# Patient Record
Sex: MALE | Race: BLACK OR AFRICAN AMERICAN | Employment: PART TIME | URBAN - METROPOLITAN AREA
[De-identification: names, ages, dates, MRNs, and addresses within clinical notes are randomized per-mention and may not be internally consistent; named-entity substitution may affect disease eponyms.]

---

## 2018-09-05 ENCOUNTER — PRE VISIT (OUTPATIENT)
Dept: ORTHOPEDICS | Facility: CLINIC | Age: 33
End: 2018-09-05

## 2018-09-05 DIAGNOSIS — M25.561 CHRONIC PAIN OF RIGHT KNEE: Primary | ICD-10-CM

## 2018-09-05 DIAGNOSIS — G89.29 CHRONIC PAIN OF RIGHT KNEE: Primary | ICD-10-CM

## 2018-09-05 NOTE — TELEPHONE ENCOUNTER
FUTURE VISIT INFORMATION      FUTURE VISIT INFORMATION:    Date: 9/6    Time: 12:45    [place] [facility] [place]  REFERRAL INFORMATION:    Referring provider:      Referring providers clinic:      Reason for visit/diagnosis  r knee pain    RECORDS REQUESTED FROM:       Clinic name Comments Records Status Imaging Status   [facility]     [facility]  received In pacs                             RECORDS STATUS

## 2018-09-06 ENCOUNTER — RADIANT APPOINTMENT (OUTPATIENT)
Dept: GENERAL RADIOLOGY | Facility: CLINIC | Age: 33
End: 2018-09-06
Attending: NURSE PRACTITIONER
Payer: COMMERCIAL

## 2018-09-06 ENCOUNTER — OFFICE VISIT (OUTPATIENT)
Dept: ORTHOPEDICS | Facility: CLINIC | Age: 33
End: 2018-09-06
Payer: COMMERCIAL

## 2018-09-06 VITALS — HEIGHT: 70 IN | WEIGHT: 173 LBS | BODY MASS INDEX: 24.77 KG/M2

## 2018-09-06 DIAGNOSIS — G89.29 CHRONIC PAIN OF RIGHT KNEE: ICD-10-CM

## 2018-09-06 DIAGNOSIS — M25.561 CHRONIC PAIN OF RIGHT KNEE: ICD-10-CM

## 2018-09-06 DIAGNOSIS — Z98.890 STATUS POST OSTEOTOMY: Primary | ICD-10-CM

## 2018-09-06 NOTE — MR AVS SNAPSHOT
After Visit Summary   9/6/2018    Kirit Logan    MRN: [id number]           Patient Information     Date Of Birth          1985        Visit Information        Provider Department      9/6/2018 12:45 PM Sheree Tapia APRN [facility]        Today's Diagnoses     Status post osteotomy    -  1       Follow-ups after your visit        Your next 10 appointments already scheduled     Sep 29, 2018 11:00 AM CDT   CT KNEE RIGHT W CONTRAST with UCCT1   [facility] CT ([facility])    [address] [address] [phone]           Please bring any scans or X-rays taken at other hospitals, if similar tests were done. Also bring a list of your medicines, including vitamins, minerals and over-the-counter drugs. It is safest to leave personal items at home.  Be sure to tell your doctor:   If you have any allergies.   If there s any chance you are pregnant.   If you are breastfeeding.    If you have diabetes as your medication may need to be adjusted for this exam.  You will have contrast for this exam. To prepare:   Do not eat or drink for 2 hours before your exam. If you need to take medicine, you may take it with small sips of water. (We may ask you to take liquid medicine as well.)   The day before your exam, drink extra fluids at least six 8-ounce glasses (unless your doctor tells you to restrict your fluids).  Patients over 70 or patients with diabetes or kidney problems:   If you haven t had a blood test (creatinine test) within the last 30 days, the Cardiologist/Radiologist may require you to get this test prior to your exam.  Please wear loose clothing, such as a sweat suit or jogging clothes. Avoid snaps, zippers and other metal. We may ask you to undress and put on a hospital gown.  If you have any questions, please call the Imaging Department where you will have your exam.            Oct 01, 2018 12:00 PM CDT  "  (Arrive by 11:45 AM)   NEW KNEE with Shaheed Stallworth MD   [facility] ([facility] [place])    [address]  [address] [facility] [address] [phone]              Future tests that were ordered for you today     Open Future Orders        Priority Expected Expires Ordered    CT Knee Right w Contrast Routine  2019            Who to contact     Please call your clinic at [phone] to:    Ask questions about your health    Make or cancel appointments    Discuss your medicines    Learn about your test results    Speak to your doctor            Additional Information About Your Visit        [employer] Information     The Local is an electronic gateway that provides easy, online access to your medical records. With The Local, you can request a clinic appointment, read your test results, renew a prescription or communicate with your care team.     To sign up for The Local visit the website at [email or web address]   You will be asked to enter the access code listed below, as well as some personal information. Please follow the directions to create your username and password.     Your access code is: [email or web address]  Expires: 2018  6:31 AM     Your access code will  in 90 days. If you need help or a new code, please contact your [facility] or call [phone] for assistance.        Care EveryWhere ID     This is your Care EveryWhere ID. This could be used by other organizations to access your [facility] medical records  [id number]        Your Vitals Were     Height BMI (Body Mass Index)                1.765 m (5' 9.5\") 25.18 kg/m2           Blood Pressure from Last 3 Encounters:   13 140/88   12 132/85    Weight from Last 3 Encounters:   18 78.5 kg (173 lb)   13 72.6 kg (160 lb)   12 72.6 kg (160 lb)                 Today's Medication Changes          These changes are accurate as of 18  " 3:55 PM.  If you have any questions, ask your nurse or doctor.               Stop taking these medicines if you haven't already. Please contact your care team if you have questions.     HYDROcodone-acetaminophen 5-325 MG per tablet   Commonly known as:  NORCO   Stopped by:  Sheree Tapia APRN CNP           ibuprofen 600 MG tablet   Commonly known as:  ADVIL/MOTRIN   Stopped by:  Sheree Tapia APRN CNP                    Primary Care Provider Office Phone # Fax #    [facility] [phone] [address] [place] [address]        Equal Access to Services     [facility]: Hadii carlos fermin hadasho Soomaali, waaxda luqadaha, qaybta kaalmada adepatito, jose alicea . So [facility] [phone].    ATENCIÓN: Si habla español, tiene a santos disposición servicios gratuitos de asistencia lingüística. [facility] [phone].    We comply with applicable federal civil rights laws and [place] laws. We do not discriminate on the basis of race, color, national origin, age, disability, sex, sexual orientation, or gender identity.            Thank you!     Thank you for choosing [facility]  for your care. Our goal is always to provide you with excellent care. Hearing back from our patients is one way we can continue to improve our services. Please take a few minutes to complete the written survey that you may receive in the mail after your visit with us. Thank you!             Your Updated Medication List - Protect others around you: Learn how to safely use, store and throw away your medicines at www.disposemymeds.org.      Notice  As of 9/6/2018  3:55 PM    You have not been prescribed any medications.

## 2018-09-06 NOTE — LETTER
"9/6/2018       RE: Kirit Logan  [address] [place] [address]     Dear Colleague,    Thank you for referring your patient, Kirit Logan, [facility]. Please see a copy of my visit note below.    CC: right knee pain    HPI:  Kirit is seen as a new patient with a chief complaint \"of worsening knee pain and it's too crooked\". He reports a history of \"pain to the outside of his knee\" that worsened after a few episodes of \"tweaking it\". Was seen and evaluated by Dr. Bucio where an HTO with IF was performed 10/2017. Patient states \"the pain has only gotten worse to the outside of his knee.\" He states the pain feels \"deep\". Denies mechanical symptoms and denies ever having medial joint pain or a varus deformity. He is confused about his post op recovery \"and why he even had this done\". He is frustrated with how \"his leg looks now\". He is unable to play soccer or \"exercise at all\". Has night pain. States \"the surgery has ruined his life\". Rates knee pain \"10\" on 0-10 scale. Reports a \"constant swelling and doesn't feel stable\". States he still has pain at the osteotomy site \"that feels like a broken leg\".    PMH: Reviewed in chart 9/6/2018    ROS: Reviewed on tablet today 9/6/2018 with all systems negative except for those listed below.    Meds/All: Reviewed in chart by myself 9/6/2018    PE:   Pleasant and cooperative male alert and oriented x3. Walks with a significant valgus thrust deformity right over left. Incision from previous HTO is healed intact. Laxity in valgus/varus noted medially. Tenderness to palpate osteotomy site and prominent hardware laterally. Tenderness to palpate lateral joint. Nontender to medial joint. Patella tracks without crepitus. Quad strength intact at 5/5 with VMO atrophy.ROM:0-122. Negative lachmans exam.    Xrays taken:  A full length has +12 degrees of valgus to the right lower extremity compared to neutral on the " right. Osteotomy has a continued lucent line with prominent hardware. Medial joint space narrowing noted right knee with distal femoral osteophyte noted. Subchondral sclerosis noted. Lateral xray reveals the patella riding more inferior without acute subluxation or fracture.    Dx:  1. S/p R knee HTO/IF with worsening pain and deformity    Plan:  1. Will obtain a CT scan to determine healing of osteotomy. Will sign a release to obtain all his preop xrays and MRI to be able to educate Kirit on his osteotomy and prognosis with the possibility of a revision HTO to maintain less valgus alignment for proper positioning.      Total time spent was 25 minutes with greater than 50% spent in face to face consultation and collaboration of care.    Again, thank you for allowing me to participate in the care of your patient.      Sincerely,    ARAM Parker CNP

## 2018-09-06 NOTE — PROGRESS NOTES
"CC: right knee pain    HPI:  Kirit is seen as a new patient with a chief complaint \"of worsening knee pain and it's too crooked\". He reports a history of \"pain to the outside of his knee\" that worsened after a few episodes of \"tweaking it\". Was seen and evaluated by Dr. Bucio where an HTO with IF was performed 10/2017. Patient states \"the pain has only gotten worse to the outside of his knee.\" He states the pain feels \"deep\". Denies mechanical symptoms and denies ever having medial joint pain or a varus deformity. He is confused about his post op recovery \"and why he even had this done\". He is frustrated with how \"his leg looks now\". He is unable to play soccer or \"exercise at all\". Has night pain. States \"the surgery has ruined his life\". Rates knee pain \"10\" on 0-10 scale. Reports a \"constant swelling and doesn't feel stable\". States he still has pain at the osteotomy site \"that feels like a broken leg\".    PMH: Reviewed in chart 9/6/2018    ROS: Reviewed on tablet today 9/6/2018 with all systems negative except for those listed below.    Meds/All: Reviewed in chart by myself 9/6/2018    PE:   Pleasant and cooperative male alert and oriented x3. Walks with a significant valgus thrust deformity right over left. Incision from previous HTO is healed intact. Laxity in valgus/varus noted medially. Tenderness to palpate osteotomy site and prominent hardware laterally. Tenderness to palpate lateral joint. Nontender to medial joint. Patella tracks without crepitus. Quad strength intact at 5/5 with VMO atrophy.ROM:0-122. Negative lachmans exam.    Xrays taken:  A full length has +12 degrees of valgus to the right lower extremity compared to neutral on the right. Osteotomy has a continued lucent line with prominent hardware. Medial joint space narrowing noted right knee with distal femoral osteophyte noted. Subchondral sclerosis noted. Lateral xray reveals the patella riding more inferior without acute subluxation or " fracture.    Dx:  1. S/p R knee HTO/IF with worsening pain and deformity    Plan:  1. Will obtain a CT scan to determine healing of osteotomy. Will sign a release to obtain all his preop xrays and MRI to be able to educate Kirit on his osteotomy and prognosis with the possibility of a revision HTO to maintain less valgus alignment for proper positioning.      Total time spent was 25 minutes with greater than 50% spent in face to face consultation and collaboration of care.

## 2018-09-27 ENCOUNTER — PRE VISIT (OUTPATIENT)
Dept: ORTHOPEDICS | Facility: CLINIC | Age: 33
End: 2018-09-27

## 2018-09-27 NOTE — TELEPHONE ENCOUNTER
FUTURE VISIT INFORMATION      FUTURE VISIT INFORMATION:    Date: 10/1    Time: 12:00    Location[place] [facility] [place]  REFERRAL INFORMATION:    Referring provider:  Jj ortiz    Referring providers clinic:  [facility]    Reason for visit/diagnosis  R knee pain    RECORDS REQUESTED FROM:       Clinic name Comments Records Status Imaging Status   [facility] Unable to locate clinic. Left vm with pt regarding a drs name or more specific name of clinic      [facility] everywhere In pacs   [facility]  received In pacs                       RECORDS STATUS

## 2018-09-29 ENCOUNTER — RADIANT APPOINTMENT (OUTPATIENT)
Dept: CT IMAGING | Facility: CLINIC | Age: 33
End: 2018-09-29
Attending: NURSE PRACTITIONER
Payer: COMMERCIAL

## 2018-09-29 DIAGNOSIS — Z98.890 STATUS POST OSTEOTOMY: ICD-10-CM

## 2018-10-01 ENCOUNTER — RADIANT APPOINTMENT (OUTPATIENT)
Dept: GENERAL RADIOLOGY | Facility: CLINIC | Age: 33
End: 2018-10-01
Attending: ORTHOPAEDIC SURGERY
Payer: COMMERCIAL

## 2018-10-01 ENCOUNTER — OFFICE VISIT (OUTPATIENT)
Dept: ORTHOPEDICS | Facility: CLINIC | Age: 33
End: 2018-10-01
Payer: COMMERCIAL

## 2018-10-01 VITALS — HEIGHT: 71 IN | BODY MASS INDEX: 25.09 KG/M2 | WEIGHT: 179.2 LBS

## 2018-10-01 DIAGNOSIS — M25.561 RIGHT KNEE PAIN: ICD-10-CM

## 2018-10-01 DIAGNOSIS — Z98.890 STATUS POST OSTEOTOMY: Primary | ICD-10-CM

## 2018-10-01 ASSESSMENT — ENCOUNTER SYMPTOMS
MUSCLE WEAKNESS: 0
BACK PAIN: 1
JOINT SWELLING: 0
MYALGIAS: 1
NECK PAIN: 1
ARTHRALGIAS: 1
STIFFNESS: 1
MUSCLE CRAMPS: 0

## 2018-10-01 ASSESSMENT — PATIENT HEALTH QUESTIONNAIRE - PHQ9
SUM OF ALL RESPONSES TO PHQ QUESTIONS 1-9: 7
10. IF YOU CHECKED OFF ANY PROBLEMS, HOW DIFFICULT HAVE THESE PROBLEMS MADE IT FOR YOU TO DO YOUR WORK, TAKE CARE OF THINGS AT HOME, OR GET ALONG WITH OTHER PEOPLE: VERY DIFFICULT
SUM OF ALL RESPONSES TO PHQ QUESTIONS 1-9: 7

## 2018-10-01 ASSESSMENT — KOOS JR
HOW SEVERE IS YOUR KNEE STIFFNESS AFTER FIRST WAKING IN MORNING: MODERATE
HOW SEVERE IS YOUR KNEE STIFFNESS AFTER FIRST WAKING IN MORNING: 1

## 2018-10-01 ASSESSMENT — ACTIVITIES OF DAILY LIVING (ADL): FUNCTION,_DAILY_LIVING_SCORE: 69.11

## 2018-10-01 NOTE — MR AVS SNAPSHOT
After Visit Summary   10/1/2018    Kirit Logan    MRN: [id number]           Patient Information     Date Of Birth          1985        Visit Information        Provider Department      10/1/2018 12:00 PM Shaheed Stallworth MD [facility]        Today's Diagnoses     Right knee pain    -  1       Follow-ups after your visit        Follow-up notes from your care team     Return in about 4 weeks (around 10/29/2018), or after MRI.      Your next 10 appointments already scheduled     Oct 03, 2018  2:30 PM CDT   MR KNEE RIGHT W/O CONTRAST with [address] [facility] [place] [facility] ([facility])    [address] [address] [phone]           How do I prepare for my exam? (Food and drink instructions) **If you will be receiving sedation or general anesthesia, please see special notes below.**  How do I prepare for my exam? (Other instructions) Take your medicines as usual, unless your doctor tells you not to. Please remove any body piercings and hair extensions before you arrive. Follow your doctor s orders. If you do not, we may have to postpone your exam. You may or may not receive IV contrast for this exam pending the discretion of the Radiologist.  You do not need to do anything special to prepare. **If you will be receiving sedation or general anesthesia, please see special notes below.**  What should I wear:  The MRI machine uses a strong magnet. Please wear clothes without metal (snaps, zippers). A sweatsuit works well, or we may give you a hospital gown.  How long does the exam take: Most tests take 30 to 60 minutes.  HOWEVER, IF YOUR DOCTOR PRESCRIBES ANESTHESIA please plan on spending four to five hours in the recovery room.  What should I bring: Bring a list of your current medicines to your exam (including vitamins, minerals and over-the-counter drugs). Also bring the results of similar scans you may have had.  Do I  need a : **If you will be receiving sedation or general anesthesia, please see special notes below.**  What should I do after the exam? No Restrictions, You may resume normal activities.  What is this test: MRI (magnetic resonance imaging) uses a strong magnet and radio waves to look inside the body. An MRA (magnetic resonance angiogram) does the same thing, but it lets us look at your blood vessels. A computer turns the radio waves into pictures showing cross sections of the body, much like slices of bread. This helps us see any problems more clearly.  Who should I call with questions: Please call the Imaging Department at your exam site with any questions. Directions, parking instructions, and other information is available on our website, [employer]/imaging.  How do I prepare if I m having sedation or anesthesia? **IMPORTANT** THE INSTRUCTIONS BELOW ARE ONLY FOR THOSE PATIENTS WHO HAVE BEEN TOLD THEY WILL RECEIVE SEDATION OR GENERAL ANESTHESIA DURING THEIR MRI PROCEDURE:  IF YOU WILL RECEIVE SEDATION (take medicine to help you relax during your exam): You must get the medicine from your doctor before you arrive. Bring the medicine to the exam. Do not take it at home. Arrive one hour early. Bring someone who can take you home after the test. Your medicine will make you sleepy. After the exam, you may not drive, take a bus or take a taxi by yourself. No eating 8 hours before your exam. You may have clear liquids up until 4 hours before your exam. (Clear liquids include water, clear tea, black coffee and fruit juice without pulp.)  IF YOU WILL RECEIVE ANESTHESIA (be asleep for your exam): Arrive 1 1/2 hours early. Bring someone who can take you home after the test. You may not drive, take a bus or take a taxi by yourself. No eating 8 hours before your exam. You may have clear liquids up until 4 hours before your exam. (Clear liquids include water, clear tea, black coffee and fruit juice without pulp.) You  "will spend four to five hours in the recovery room.            2018  2:45 PM CST   (Arrive by 2:30 PM)   RETURN KNEE with Shaheed Stallworth MD   [facility] ([facility] [place])    [address] [place] [address] [phone]              Future tests that were ordered for you today     Open Future Orders        Priority Expected Expires Ordered    MR Knee Right w/o Contrast Routine  10/1/2019 10/1/2018            Who to contact     Please call your clinic at [phone] to:    Ask questions about your health    Make or cancel appointments    Discuss your medicines    Learn about your test results    Speak to your doctor            Additional Information About Your Visit        [employer] Information     [employer] is an electronic gateway that provides easy, online access to your medical records. With [employer], you can request a clinic appointment, read your test results, renew a prescription or communicate with your care team.     To sign up for [employer] visit the website at [email or web address]   You will be asked to enter the access code listed below, as well as some personal information. Please follow the directions to create your username and password.     Your access code is: [email or web address]  Expires: 2018  6:31 AM     Your access code will  in 90 days. If you need help or a new code, please contact your [facility] or call [phone] for assistance.        Care EveryWhere ID     This is your Care EveryWhere ID. This could be used by other organizations to access your [facility] medical records  [id number]        Your Vitals Were     Height BMI (Body Mass Index)                1.791 m (5' 10.5\") 25.35 kg/m2           Blood Pressure from Last 3 Encounters:   13 140/88   12 132/85    Weight from Last 3 Encounters:   10/01/18 81.3 kg (179 lb 3.2 oz)   18 78.5 kg (173 lb)   13 72.6 " kg (160 lb)               Primary Care Provider Office Phone # Fax #    [facility] [phone] [address] [place] [address]        Equal Access to Services     NOREEN JASMINE : Hadii aad ku hadmassielo Soomaali, waaxda luqadaha, qaybta kaalmada adeegyada, jose cardonan pati morris laFlakitonathan foster. So [facility] [phone].    ATENCIÓN: Si habla español, tiene a santos disposición servicios gratuitos de asistencia lingüística. Llame al [phone].    We comply with applicable federal civil rights laws and [place] laws. We do not discriminate on the basis of race, color, national origin, age, disability, sex, sexual orientation, or gender identity.            Thank you!     Thank you for choosing [facility]  for your care. Our goal is always to provide you with excellent care. Hearing back from our patients is one way we can continue to improve our services. Please take a few minutes to complete the written survey that you may receive in the mail after your visit with us. Thank you!             Your Updated Medication List - Protect others around you: Learn how to safely use, store and throw away your medicines at www.disposemymeds.org.      Notice  As of 10/1/2018  1:35 PM    You have not been prescribed any medications.

## 2018-10-01 NOTE — LETTER
"10/1/2018       RE: Kirit Logan  [address] [place] [address]     Dear Colleague,    Thank you for referring your patient, Kirit Logan, to the [facility] at [facility]. Please see a copy of my visit note below.      Answers for HPI/ROS submitted by the patient on 10/1/2018   General Symptoms: No  Skin Symptoms: No  HENT Symptoms: No  EYE SYMPTOMS: No  HEART SYMPTOMS: No  LUNG SYMPTOMS: No  INTESTINAL SYMPTOMS: No  URINARY SYMPTOMS: No  REPRODUCTIVE SYMPTOMS: No  SKELETAL SYMPTOMS: Yes  BLOOD SYMPTOMS: No  NERVOUS SYSTEM SYMPTOMS: No  MENTAL HEALTH SYMPTOMS: No  Back pain: Yes  Muscle aches: Yes  Neck pain: Yes  Swollen joints: No  Joint pain: Yes  Bone pain: Yes  Muscle cramps: No  Muscle weakness: No  Joint stiffness: Yes  Bone fracture: No  PHQ-2 Score: 5  If you checked off any problems, how difficult have these problems made it for you to do your work, take care of things at home, or get along with other people?: Very difficult  PHQ9 TOTAL SCORE: 7      Service Date: 10/01/2018      Kirit is seen in consultation at the request of Dr. Jj Brooks.  Kirit notes pain of the right lateral knee and deformity right lower extremity.  He reports a multiyear history of problems with the right knee.  He first had an injury to his right knee where he bruised it against a car door in 2006 or 2007.  He states that this bruise healed spontaneously without any sequelae.  Over an 8- to 9-year period, he was asymptomatic.  He noticed a gradual onset of right knee pain in 2015.  He sought the attention of Dr. 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  He has sought the attention of Dr. Rainer Bucio who performed a right " knee arthroscopy with partial medial meniscectomy and microfracture of the medial femoral condyle and medial tibial plateau and a valgus producing high tibial osteotomy.  Since that time, he noted increased deformity of his right knee and difficulty with walking and running.  He has been seen by multiple providers since this time, several of which have recommended that he come see me in regards to this deformity.  He reports that he developed a blood clot in his right leg following his osteotomy and that he was told that he had ligament problems in his right leg.  There is a history of previous ACL reconstruction on the left knee.        PHYSICAL EXAMINATION:  Kirit is alert and oriented x3.  He is a healthy-appearing man in no acute distress.  He has a severe valgus deformity of his right knee estimating at 20 degrees.  Standing examination further shows a 10 mm discrepancy, right shorter than left.  Gait is characterized by a large medial thrust of his right knee and a varus position of his left knee.  Leg length discrepancy measures 10 mm.  His range of motion of hips and ankles is normal.  Range of motion of his knees is abnormal, right knee from 5-120 degrees, left knee from 0-130 degrees.  Ligamentous examination on the left knee shows stable endpoint.  On the right knee, there is a large healed anterolateral incision with atrophy of the right calf and right thigh of 1 cm.  There is slight crepitus at the patellofemoral joint and there is a positive posterior drawer sign but no posterior sag.  Medial and lateral collateral ligaments are stable on both sides.  ACL was intact on the right side.  The motor exam is normal.  Sensory exam has patchy hip esthesia over the anterolateral leg and calf.  Dorsalis pedis pulses are normal.  Range of motion of the ankle is normal.  There is no cellulitis or lymphangitis.        IMAGING:  Standing AP x-ray of both lower extremities shows a 15 degree valgus alignment of the  right knee and neutral alignment of the left knee.  There are 2 Kurosaka screws in the left knee, typical of ACL reconstruction.  Right knee shows a marked valgus position measuring 15 degrees with a weightbearing line falling lateral to the lateral joint line.  Narrowing of the medial compartment is evident with subchondral sclerosis evident.  The osteotomy is healed with opening through the medial cortex and the upper tibial osteotomy site with an Intermedics plate present.  The fibula is overriding in the right upper tibia.        ASSESSMENT:  Right genu valgus associated with prior valgus-producing osteotomy with a PCL deficient knee and previous meniscal damage including hyalin and meniscal cartilage in the medial compartment.      TREATMENT:  We should obtain stress x-rays of the knee, both medially and laterally, and an MRI of the knee so that we can assess the extent to which the knee is unstable posteriorly as well as the position of the knee in the lateral.  These x-rays confirm medial osteoarthritis and a posterior cruciate insufficient knee with posterior stress evident.  There is also patella melquiades and there is degenerative change under the medial aspect of the patella with a large medial osteophyte on the medial femoral condyle.  The stress views of the right knee, both in the lateral position, show posterior insufficiency with posterior alignment evident.  The valgus stress x-rays show that the lateral compartment widens and the medial compartment narrows on varus stress significantly and the lateral compartment does not widen but the medial compartment opens on valgus stress.        ASSESSMENT:     1.  Medial compartment osteoarthritis with posterior cruciate insufficient knee.   2.  Excessive genu valgus.      TREATMENT:  We will get an MRI scan of the knee to assess the internal chondral and meniscal surfaces and do a corrective osteotomy to correct him out of the severe valgus, likely an opening  wedge anterolateral in the oblique plane and likely with circular external fixation so that we can gradually dial it into ideal position.      cc:   Hossein Medel MD   [facility]   [address] [place]  [address]      Tom Brooks MD   [facility]   [address] [place]  [address]      Rainer Bucio MD   [facility]   [address]   [place]  [address]        Again, thank you for allowing me to participate in the care of your patient.      Sincerely,    Shaheed Stallworth MD

## 2018-10-02 ASSESSMENT — PATIENT HEALTH QUESTIONNAIRE - PHQ9: SUM OF ALL RESPONSES TO PHQ QUESTIONS 1-9: 7

## 2018-10-02 NOTE — PROGRESS NOTES
"Service Date: 10/01/2018      Kirit is seen in consultation at the request of Dr. Jj Brooks.  Kirit notes pain of the right lateral knee and deformity right lower extremity.  He reports a multiyear history of problems with the right knee.  He first had an injury to his right knee where he bruised it against a car door in 2006 or 2007.  He states that this bruise healed spontaneously without any sequelae.  Over an 8- to 9-year period, he was asymptomatic.  He noticed a gradual onset of right knee pain in 2015.  He sought the attention of Dr. 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  He has sought the attention of Dr. Rainer Bucio who performed a right knee arthroscopy with partial medial meniscectomy and microfracture of the medial femoral condyle and medial tibial plateau and a valgus producing high tibial osteotomy.  Since that time, he noted increased deformity of his right knee and difficulty with walking and running.  He has been seen by multiple providers since this time, several of which have recommended that he come see me in regards to this deformity.  He reports that he developed a blood clot in his right leg following his osteotomy and that he was told that he had ligament problems in his right leg.  There is a history of previous ACL reconstruction on the left knee.        PHYSICAL EXAMINATION:  Kirit is alert and oriented x3.  He is a healthy-appearing man in no acute distress.  He has a severe valgus deformity of his right knee estimating at 20 degrees.  Standing examination further shows a 10 mm discrepancy, right shorter than left.  Gait is characterized by a large medial thrust of his right knee and a varus position of his left knee.  Leg length " discrepancy measures 10 mm.  His range of motion of hips and ankles is normal.  Range of motion of his knees is abnormal, right knee from 5-120 degrees, left knee from 0-130 degrees.  Ligamentous examination on the left knee shows stable endpoint.  On the right knee, there is a large healed anterolateral incision with atrophy of the right calf and right thigh of 1 cm.  There is slight crepitus at the patellofemoral joint and there is a positive posterior drawer sign but no posterior sag.  Medial and lateral collateral ligaments are stable on both sides.  ACL was intact on the right side.  The motor exam is normal.  Sensory exam has patchy hip esthesia over the anterolateral leg and calf.  Dorsalis pedis pulses are normal.  Range of motion of the ankle is normal.  There is no cellulitis or lymphangitis.        IMAGING:  Standing AP x-ray of both lower extremities shows a 15 degree valgus alignment of the right knee and neutral alignment of the left knee.  There are 2 Kurosaka screws in the left knee, typical of ACL reconstruction.  Right knee shows a marked valgus position measuring 15 degrees with a weightbearing line falling lateral to the lateral joint line.  Narrowing of the medial compartment is evident with subchondral sclerosis evident.  The osteotomy is healed with opening through the medial cortex and the upper tibial osteotomy site with an Intermedics plate present.  The fibula is overriding in the right upper tibia.        ASSESSMENT:  Right genu valgus associated with prior valgus-producing osteotomy with a PCL deficient knee and previous meniscal damage including hyalin and meniscal cartilage in the medial compartment.      TREATMENT:  We should obtain stress x-rays of the knee, both medially and laterally, and an MRI of the knee so that we can assess the extent to which the knee is unstable posteriorly as well as the position of the knee in the lateral.  These x-rays confirm medial osteoarthritis and a  posterior cruciate insufficient knee with posterior stress evident.  There is also patella melquiades and there is degenerative change under the medial aspect of the patella with a large medial osteophyte on the medial femoral condyle.  The stress views of the right knee, both in the lateral position, show posterior insufficiency with posterior alignment evident.  The valgus stress x-rays show that the lateral compartment widens and the medial compartment narrows on varus stress significantly and the lateral compartment does not widen but the medial compartment opens on valgus stress.        ASSESSMENT:     1.  Medial compartment osteoarthritis with posterior cruciate insufficient knee.   2.  Excessive genu valgus.      TREATMENT:  We will get an MRI scan of the knee to assess the internal chondral and meniscal surfaces and do a corrective osteotomy to correct him out of the severe valgus, likely an opening wedge anterolateral in the oblique plane and likely with circular external fixation so that we can gradually dial it into ideal position.      cc:   Hossein Medel MD   [facility]   [address] [place]  [address]      Tom Brooks MD   [facility]   [address] [facility], [place]  [address]      Rainer Bucio MD   [facility]   [address]   [place]  [address]         MANDY SHERIDAN MD             D: 10/01/2018   T: 10/02/2018   MT: nargis      Name:     JANIE HENNING   MRN:      [id number]        Account:      [id number]   :      1985           Service Date: 10/01/2018      Document: [id number]

## 2018-10-04 NOTE — PROGRESS NOTES
Answers for HPI/ROS submitted by the patient on 10/1/2018   General Symptoms: No  Skin Symptoms: No  HENT Symptoms: No  EYE SYMPTOMS: No  HEART SYMPTOMS: No  LUNG SYMPTOMS: No  INTESTINAL SYMPTOMS: No  URINARY SYMPTOMS: No  REPRODUCTIVE SYMPTOMS: No  SKELETAL SYMPTOMS: Yes  BLOOD SYMPTOMS: No  NERVOUS SYSTEM SYMPTOMS: No  MENTAL HEALTH SYMPTOMS: No  Back pain: Yes  Muscle aches: Yes  Neck pain: Yes  Swollen joints: No  Joint pain: Yes  Bone pain: Yes  Muscle cramps: No  Muscle weakness: No  Joint stiffness: Yes  Bone fracture: No  PHQ-2 Score: 5  If you checked off any problems, how difficult have these problems made it for you to do your work, take care of things at home, or get along with other people?: Very difficult  PHQ9 TOTAL SCORE: 7

## 2018-10-09 ENCOUNTER — RADIANT APPOINTMENT (OUTPATIENT)
Dept: MRI IMAGING | Facility: CLINIC | Age: 33
End: 2018-10-09
Attending: ORTHOPAEDIC SURGERY
Payer: COMMERCIAL

## 2018-11-05 ENCOUNTER — OFFICE VISIT (OUTPATIENT)
Dept: ORTHOPEDICS | Facility: CLINIC | Age: 33
End: 2018-11-05
Payer: COMMERCIAL

## 2018-11-05 VITALS — HEIGHT: 70 IN | BODY MASS INDEX: 25.77 KG/M2 | WEIGHT: 180 LBS

## 2018-11-05 DIAGNOSIS — Z98.890 STATUS POST OSTEOTOMY: Primary | ICD-10-CM

## 2018-11-05 DIAGNOSIS — M21.061 VALGUS DEFORMITY, NOT ELSEWHERE CLASSIFIED, RIGHT KNEE: ICD-10-CM

## 2018-11-05 ASSESSMENT — ENCOUNTER SYMPTOMS
STIFFNESS: 0
POOR WOUND HEALING: 0
DEPRESSION: 0
JOINT SWELLING: 1
PANIC: 0
NECK PAIN: 1
HALLUCINATIONS: 0
FATIGUE: 1
ARTHRALGIAS: 1
ALTERED TEMPERATURE REGULATION: 1
INSOMNIA: 0
DECREASED CONCENTRATION: 0
POLYPHAGIA: 0
SKIN CHANGES: 0
NAIL CHANGES: 0
NIGHT SWEATS: 0
MUSCLE WEAKNESS: 0
MUSCLE CRAMPS: 0
POLYDIPSIA: 1
CHILLS: 0
BACK PAIN: 1
NERVOUS/ANXIOUS: 0
INCREASED ENERGY: 0
DECREASED APPETITE: 0
MYALGIAS: 1
WEIGHT GAIN: 0
WEIGHT LOSS: 1
FEVER: 0

## 2018-11-05 NOTE — MR AVS SNAPSHOT
After Visit Summary   11/5/2018    Kirit Logan    MRN: [id number]           Patient Information     Date Of Birth          1985        Visit Information        Provider Department      11/5/2018 2:45 PM Shaheed Stallworth MD [facility]        Today's Diagnoses     Status post osteotomy    -  1    Valgus deformity, not elsewhere classified, right knee           Follow-ups after your visit        Your next 10 appointments already scheduled     Dec 06, 2018  2:00 PM CST   (Arrive by 1:45 PM)   PAC EVALUATION with ARAM Bird [facility] ([facility])    [address] [place] [address]   [phone]            Dec 21, 2018   Procedure with Shaheed Stallworth MD   [facility] [place] (--)    [address] [facility] [facility] [address]   [phone]            Jan 03, 2019 11:00 AM CST   (Arrive by 10:45 AM)   Post-Op with ARAM [facility] the [facility] ([facility])    [address] [place] [id number] [phone]            Feb 04, 2019 11:30 AM CST   (Arrive by 11:15 AM)   Return Visit with Shaheed Stallworth MD   [facility] ([facility])    [address] [place] [id number] [phone]              Who to contact     Please call your clinic at [phone] to:    Ask questions about your health    Make or cancel appointments    Discuss your medicines    Learn about your test results    Speak to your doctor            Additional Information About Your Visit        MyChart Information     [employer] gives you secure access to your electronic health record. If you see a primary care provider, you can also send messages to your care team and make appointments. If you have questions, please call your primary care clinic.  If you do not have a primary  "care provider, please call [phone] and they will assist you.      [employer] is an electronic gateway that provides easy, online access to your medical records. With [employer], you can request a clinic appointment, read your test results, renew a prescription or communicate with your care team.     To access your existing account, please contact your [facility] or call [phone] for assistance.        Care EveryWhere ID     This is your Care EveryWhere ID. This could be used by other organizations to access your [place] medical records  [id number]        Your Vitals Were     Height BMI (Body Mass Index)                1.778 m (5' 10\") 25.83 kg/m2           Blood Pressure from Last 3 Encounters:   09/25/13 140/88   03/04/12 132/85    Weight from Last 3 Encounters:   11/05/18 81.6 kg (180 lb)   10/01/18 81.3 kg (179 lb 3.2 oz)   09/06/18 78.5 kg (173 lb)              We Performed the Following     [facility]-Operative Worksheet (Default Surgery Request)        Primary Care Provider Office Phone # Fax #    [facility] [phone] [address] [place] [address]        Equal Access to Services     NOREEN JASMINE : Hadii carlos ku hadasho Soomaali, waaxda luqadaha, qaybta kaalmada adeegyada, waxay idiin haypatyn pati foster. So [facility] [phone].    ATENCIÓN: Si habla español, tiene a santos disposición servicios gratuitos de asistencia lingüística. Llame al [phone].    We comply with applicable federal civil rights laws and [place] laws. We do not discriminate on the basis of race, color, national origin, age, disability, sex, sexual orientation, or gender identity.            Thank you!     Thank you for choosing [facility]  for your care. Our goal is always to provide you with excellent care. Hearing back from our patients is one way we can continue to improve our services. Please take a few minutes to complete the written " survey that you may receive in the mail after your visit with us. Thank you!             Your Updated Medication List - Protect others around you: Learn how to safely use, store and throw away your medicines at www.disposemymeds.org.      Notice  As of 11/5/2018 11:59 PM    You have not been prescribed any medications.

## 2018-11-05 NOTE — LETTER
11/5/2018       RE: Kirit Logan  [address] [place] [address]     Dear Colleague,    Thank you for referring your patient, Kirit Logan, to the [facility] at [facility]. Please see a copy of my visit note below.    Visit Date:   11/05/2018      Kirit was seen in followup in regards to severe overcorrection right knee and leg associated with prior medial compartment osteoarthritis.  He describes persisting and significant diffuse lateral knee and leg pain associated with severe valgus deformity of the right lower extremity.  He notes intermittent right medial knee pain.  Reevaluation, MRI scan demonstrates and review shows profound medial osteoarthritis with no lateral compartment osteoarthritis and no patellofemoral arthritis of significance.  There appears to be absence of the posterior cruciate ligament and deficiency of the anterior cruciate ligament on the MRI scan.  I have reviewed the circumstances with Kirit again.      ASSESSMENT:  Severe medial compartment osteoarthritis right knee with overcorrection and 15 degrees of genu valgus.      TREATMENT:  Removal of right tibial plate and screws combined with revision right upper tibial osteotomy just below the tibial tubercle and circular external fixator application with gradual reconstitution of the alignment to a +4 degree mechanical axis alignment.  This will require the use of the external fixator for approximately 3-4 months, with then likely improved knee pain relief for treatment of his medial osteoarthritis.  He understands and wishes to proceed.         MANDY SHERIDAN MD

## 2018-11-06 NOTE — NURSING NOTE
Teaching Flowsheet   Relevant Diagnosis: malunion right tibia  Teaching Topic: preop 1) right tibial plate removal  2) right HTO  3) Right TSF    Kirit lives with his fiance and 3 kids (aged 1,4,11) in [place].  He is presently not working, last job was at [employer].  Pt states this is no longer work comp.  Pt sustained DVT postop last Oct 2017 after his last surgery.     Person(s) involved in teaching:   Patient     Motivation Level:  Asks Questions: Yes  Eager to Learn: Yes  Cooperative: Yes  Receptive (willing/able to accept information): Yes  Any cultural factors/Adventist beliefs that may influence understanding or compliance? No  Comments:      Patient demonstrates understanding of the following:  Reason for the appointment, diagnosis and treatment plan: Yes  Knowledge of proper use of medications and conditions for which they are ordered (with special attention to potential side effects or drug interactions): Yes  Which situations necessitate calling provider and whom to contact: Yes       Teaching Concerns Addressed:   Comments:      Proper use and care of External fixator (medical equip, care aids, etc.): Yes  Nutritional needs and diet plan: Yes  Pain management techniques: Yes  Wound Care: Yes  How and/when to access community resources: NA     Instructional Materials Used/Given: preop pkt, antiseptic soap, pin care handouts, TSF booklet for external fixator     Time spent with patient: 30 minutes.

## 2018-11-07 ENCOUNTER — TELEPHONE (OUTPATIENT)
Dept: ORTHOPEDICS | Facility: CLINIC | Age: 33
End: 2018-11-07

## 2018-11-07 NOTE — TELEPHONE ENCOUNTER
Patient is scheduled for surgery with Dr. Stallworth    Spoke or left message with: Patient    Date of Surgery: 12/21/18    Location: [place]    Post ops: 2 week & 6 weeks    Pre-op with surgeon (if applicable): Complete    H&P: Scheduled with PAC 12/6/18    Additional imaging/appointments: N/A    Surgery packet: Received in clinic    Additional comments: N/A

## 2018-11-07 NOTE — PROGRESS NOTES
Visit Date:   2018      Kirit was seen in followup in regards to severe overcorrection right knee and leg associated with prior medial compartment osteoarthritis.  He describes persisting and significant diffuse lateral knee and leg pain associated with severe valgus deformity of the right lower extremity.  He notes intermittent right medial knee pain.  Reevaluation, MRI scan demonstrates and review shows profound medial osteoarthritis with no lateral compartment osteoarthritis and no patellofemoral arthritis of significance.  There appears to be absence of the posterior cruciate ligament and deficiency of the anterior cruciate ligament on the MRI scan.  I have reviewed the circumstances with Kirit again.      ASSESSMENT:  Severe medial compartment osteoarthritis right knee with overcorrection and 15 degrees of genu valgus.      TREATMENT:  Removal of right tibial plate and screws combined with revision right upper tibial osteotomy just below the tibial tubercle and circular external fixator application with gradual reconstitution of the alignment to a +4 degree mechanical axis alignment.  This will require the use of the external fixator for approximately 3-4 months, with then likely improved knee pain relief for treatment of his medial osteoarthritis.  He understands and wishes to proceed.         MANDY SHERIDAN MD             D: 2018   T: 2018   MT: DAINA      Name:     KIRIT HENNING   MRN:      [id number]        Account:      [id number]   :      1985           Visit Date:   2018      Document: [id number]

## 2018-12-13 ENCOUNTER — ANESTHESIA EVENT (OUTPATIENT)
Dept: SURGERY | Facility: CLINIC | Age: 33
DRG: 482 | End: 2018-12-13
Payer: COMMERCIAL

## 2018-12-13 ENCOUNTER — OFFICE VISIT (OUTPATIENT)
Dept: SURGERY | Facility: CLINIC | Age: 33
End: 2018-12-13
Payer: COMMERCIAL

## 2018-12-13 VITALS
BODY MASS INDEX: 25.83 KG/M2 | DIASTOLIC BLOOD PRESSURE: 80 MMHG | HEART RATE: 69 BPM | SYSTOLIC BLOOD PRESSURE: 133 MMHG | TEMPERATURE: 98 F | WEIGHT: 180.4 LBS | HEIGHT: 70 IN | OXYGEN SATURATION: 97 %

## 2018-12-13 DIAGNOSIS — Z01.818 PREOP EXAMINATION: Primary | ICD-10-CM

## 2018-12-13 ASSESSMENT — MIFFLIN-ST. JEOR: SCORE: 1769.54

## 2018-12-13 NOTE — ANESTHESIA PREPROCEDURE EVALUATION
Anesthesia Pre-Procedure Evaluation    Patient: Kirit Logan   MRN:     [id number] Gender:   male   Age:    33 year old :      1985        Preoperative Diagnosis: Excessive Genu Valgus, Status Post Osteotomy, Valgus Deformity Right Knee    Procedure(s):  Right Tibial Plate Removal  RIGHT HIGH TIBIAL OSTEOTOMY,RIGHT GERRY SPATIAL FRAME     Past Medical History:   Diagnosis Date     iamSPRAIN CRUCIATE LIG KNEE 2006      Past Surgical History:   Procedure Laterality Date     ORTHOPEDIC SURGERY      ACL reconstruction           Anesthesia Evaluation     . Pt has had prior anesthetic. Type: General    No history of anesthetic complications          ROS/MED HX    ENT/Pulmonary:  - neg pulmonary ROS     Neurologic:  - neg neurologic ROS     Cardiovascular:  - neg cardiovascular ROS   (+) ----. : . . . :. . No previous cardiac testing       METS/Exercise Tolerance:  >4 METS   Hematologic: Comments: DVT RLE after last surgery    (+) History of blood clots pt is not anticoagulated, -      Musculoskeletal:   (+) , , other musculoskeletal- right leg pain      GI/Hepatic:  - neg GI/hepatic ROS       Renal/Genitourinary:  - ROS Renal section negative       Endo:  - neg endo ROS       Psychiatric:  - neg psychiatric ROS       Infectious Disease:  - neg infectious disease ROS       Malignancy:      - no malignancy   Other:    (+) No chance of pregnancy C-spine cleared: N/A, H/O Chronic Pain,no other significant disability                        PHYSICAL EXAM:   Mental Status/Neuro: A/A/O   Airway: Facies: Feasible  Mallampati: I  Mouth/Opening: Full  TM distance: > 6 cm  Neck ROM: Full   Respiratory: Auscultation: CTAB     Resp. Rate: Normal     Resp. Effort: Normal      CV: Rhythm: Regular  Heart: Normal Sounds   Comments:      Dental: Normal                  No results found for: WBC, HGB, HCT, PLT, CRP, SED, NA, POTASSIUM, CHLORIDE, CO2, BUN, CR, GLC, MANJULA, PHOS, MAG, ALBUMIN, PROTTOTAL, ALT, AST, GGT, ALKPHOS,  "BILITOTAL, BILIDIRECT, LIPASE, AMYLASE, LORETA, PTT, INR, FIBR, TSH, T4, T3, HCG, HCGS, CKTOTAL, CKMB, TROPN    Preop Vitals  BP Readings from Last 3 Encounters:   09/25/13 140/88   03/04/12 132/85    Pulse Readings from Last 3 Encounters:   09/25/13 68      Resp Readings from Last 3 Encounters:   09/25/13 18   03/04/12 16    SpO2 Readings from Last 3 Encounters:   09/25/13 97%   03/04/12 100%      Temp Readings from Last 1 Encounters:   09/25/13 98.1  F (36.7  C) (Oral)    Ht Readings from Last 1 Encounters:   11/05/18 1.778 m (5' 10\")      Wt Readings from Last 1 Encounters:   11/05/18 81.6 kg (180 lb)    Estimated body mass index is 25.83 kg/m  as calculated from the following:    Height as of 11/5/18: 1.778 m (5' 10\").    Weight as of 11/5/18: 81.6 kg (180 lb).     LDA:            Assessment:   ASA SCORE: 2    NPO Status: > 6 hours since completed Solid Foods   Documentation: H&P complete; Preop Testing complete; Consents complete   Proceeding: Proceed without further delay  Tobacco Use:  Active user of Tobacco     Plan:   [place]. Type:  General   Pre-Induction: Midazolam IV; Acetaminophen PO   Induction:  IV (Standard)   Airway: Oral ETT   Access/Monitoring: PIV   Maintenance: Balanced   Emergence: Procedure Site   Logistics: Same Day Surgery     Postop Pain/Sedation Strategy:  Standard-Options: Opioids PRN     PONV Management:  Adult Risk Factors:, Postop Opioids  Prevention: Ondansetron; Dexamethasone     CONSENT: Direct conversation   Plan and risks discussed with: Patient   Blood Products: Consented (ALL Blood Products)                  PAC Discussion and Assessment    ASA Classification: 1  Case is suitable for: [place]  Anesthetic techniques and relevant risks discussed: Regional  Invasive monitoring and risk discussed: No  Types:   Possibility and Risk of blood transfusion discussed: No  NPO instructions given:   Additional anesthetic preparation and risks discussed:   Needs early admission to pre-op " area:   Other:     PAC Resident/NP Anesthesia Assessment:  Kirit Logan is a 33 year old male scheduled to undergo 1) Right tibial plate removal  2) Right high tibial osteotomy  3) Right Harleen Spatial Frame with Dr. Stallworth on 12/21/18. He has the following specific operative considerations:   - RCRI : No serious cardiac risks.    - VTE risk: 1.8%  - AGUSTIN # of risks 1/8 = Low risk   - Risk of PONV score = 2.  If > 2, anti-emetic intervention recommended.     --Persistent right knee pain and leg deformity after surgery. Above procedure now planned with spinal. Patient comfortable with plan.      --Generally healthy male with no significant cardiopulmonary history. Nonsmoker. Good activity tolerance.        --History of post op DVT right lower extremity after surgery. Was anticoagulated for a time.      --Pain management. Discussed possibility of nerve block if appropriate for patient's procedure. Dr. Stallworth is requesting adductor nerve block. Final counseling and decisions by regional team on DOS      Patient was reviewed by Dr Hardin.      Reviewed and Signed by PAC Mid-Level Provider/Resident  Mid-Level Provider/Resident: ARAM Pittman, CNS  Date: 12/13/18  Time: 4:00pm    Attending Anesthesiologist Anesthesia Assessment:  33 year old for hardware removal and tibial osteotomy in management of leg pain. Patient has no significant cardiac or pulmonary disease.    Patient/case discussed with SATNAM/resident; agree with above assessment. No need to see patient. Patient is appropriate for the planned procedure without further work-up or medical management.      Reviewed and Signed by PAC Anesthesiologist  Anesthesiologist: kiesha  Date: 12/13/2018  Time:   Pass/Fail: Pass  Disposition:     PAC Pharmacist Assessment:        Pharmacist:   Date:   Time:        ARAM Bird CNS

## 2018-12-13 NOTE — H&P
Pre-Operative H & P     CC:  Preoperative exam to assess for increased cardiopulmonary risk while undergoing surgery and anesthesia.    Date of Encounter: 12/13/2018  Primary Care Physician:  [facility], [place]  Reason for visit: Status post osteotomy [Z98.890]  - Primary     HPI  Kirit Logan is a 33 year old male who presents for pre-operative H & P in preparation for 1) Right tibial plate removal  2) Right high tibial osteotomy  3) Right Thomson Spatial Frame with Dr. Stallworth on 12/21/18 at [facility]. History is obtained from the patient.     Patient who was recently referred to Dr. Stallworth with concerns for right lateral knee pain and deformity to the right lower extremity. His issues with this knee occurred in 06-07 with injury after hitting it against a car door. He experienced bruising and pain which healed. Later, in 2015 he had a gradual onset of pain. He has had multiple evaluations and is s/p arthroscopy in 2015 and later after another injury had arthroscopy, partial medial meniscectomy and microfracture of the medial femoral condyle and medial tibial plateau and a valgus producing high tibial osteotomy. Since that time, he noted increased deformity of his right knee and difficulty with walking and running. He has been seen by multiple providers since this time.     Patient developed a blood clot in his right leg following his osteotomy and was anticoagulated for a time. There is a history of previous ACL reconstruction on the left knee. His imaging was reviewed and he was counseled for above procedure.     Past Medical History  Past Medical History:   Diagnosis Date     History of DVT (deep vein thrombosis)     lower extremity post op     iamSPRAIN CRUCIATE LIG KNEE 5/26/2006       Past Surgical History  Past Surgical History:   Procedure Laterality Date     ARTHROSCOPY KNEE  2015    X2     ORTHOPEDIC SURGERY  2010    ACL reconstruction        Hx of Blood  transfusions/reactions: Denies.     Hx of abnormal bleeding or anti-platelet use: Denies.     Menstrual history: No LMP for male patient.    Steroid use in the last year: Denies.     Personal or FH with difficulty with Anesthesia:  Denies.     Prior to Admission Medications  No current outpatient medications on file.       Allergies  No Known Allergies    Social History  Social History     Socioeconomic History     Marital status: Single     Spouse name: Not on file     Number of children: Not on file     Years of education: Not on file     Highest education level: Not on file   Social Needs     Financial resource strain: Not on file     Food insecurity - worry: Not on file     Food insecurity - inability: Not on file     Transportation needs - medical: Not on file     Transportation needs - non-medical: Not on file   Occupational History     Not on file   Tobacco Use     Smoking status: Never Smoker     Smokeless tobacco: Never Used   Substance and Sexual Activity     Alcohol use: Yes     Comment: socially     Drug use: No     Sexual activity: Not on file   Other Topics Concern     Not on file   Social History Narrative     Not on file       Family History  Family History   Problem Relation Age of Onset     No Known Problems Mother      No Known Problems Father        Review of Systems    ROS/MED HX  The complete review of systems is negative other than noted in the HPI or here.   ENT/Pulmonary:  - neg pulmonary ROS     Neurologic:  - neg neurologic ROS     Cardiovascular:  - neg cardiovascular ROS   (+) ----. : . . . :. . No previous cardiac testing       METS/Exercise Tolerance:  >4 METS   Hematologic:  - neg hematologic  ROS       Musculoskeletal:   (+) , , other musculoskeletal- right leg pain      GI/Hepatic:  - neg GI/hepatic ROS       Renal/Genitourinary:  - ROS Renal section negative       Endo:  - neg endo ROS       Psychiatric:  - neg psychiatric ROS       Infectious Disease:  - neg infectious disease ROS  "      Malignancy:      - no malignancy   Other:    (+) No chance of pregnancy C-spine cleared: N/A, H/O Chronic Pain,no other significant disability              PHYSICAL EXAM:   Mental Status/Neuro: A/A/O   Airway: Facies: Feasible  Mallampati: I  Mouth/Opening: Full  TM distance: > 6 cm  Neck ROM: Full   Respiratory: Auscultation: CTAB     Resp. Rate: Normal     Resp. Effort: Normal      CV: Rhythm: Regular  Heart: Normal Sounds   Comments:    Preop Vitals  BP Readings from Last 3 Encounters:   09/25/13 140/88   03/04/12 132/85    Pulse Readings from Last 3 Encounters:   09/25/13 68      Resp Readings from Last 3 Encounters:   09/25/13 18   03/04/12 16    SpO2 Readings from Last 3 Encounters:   09/25/13 97%   03/04/12 100%      Temp Readings from Last 1 Encounters:   09/25/13 98.1  F (36.7  C) (Oral)    Ht Readings from Last 1 Encounters:   11/05/18 1.778 m (5' 10\")      Wt Readings from Last 1 Encounters:   11/05/18 81.6 kg (180 lb)    Estimated body mass index is 25.83 kg/m  as calculated from the following:    Height as of 11/5/18: 1.778 m (5' 10\").    Weight as of 11/5/18: 81.6 kg (180 lb).     Temp: 98  F (36.7  C) Temp src: Oral BP: 133/80 Pulse: 69     SpO2: 97 %         180 lbs 6.4 oz  5' 10\"   Body mass index is 25.88 kg/m .       Physical Exam  Constitutional: Awake, alert, cooperative, no apparent distress, and appears stated age.  Eyes: Pupils equal, round and reactive to light, extra ocular muscles intact, sclera clear, conjunctiva normal.  HENT: Normocephalic, oral pharynx with moist mucus membranes, good dentition. No goiter appreciated.   Respiratory: Clear to auscultation bilaterally, no crackles or wheezing. No cough or obvious dyspnea.  Cardiovascular: Regular rate and rhythm, normal S1 and S2, and no murmur noted.  Carotids +2, no bruits. No edema. Palpable pulses to radial  DP and PT arteries.   GI: Normal bowel sounds, soft, non-distended, non-tender, no masses palpated, no " hepatosplenomegaly.    Lymph/Hematologic: No cervical lymphadenopathy and no supraclavicular lymphadenopathy.  Genitourinary:  Deferred.   Skin: Warm and dry.  No rashes at anticipated surgical site.   Musculoskeletal: Full ROM of neck. There is no redness, warmth, or swelling of the joints. Gross motor strength is normal.    Neurologic: Awake, alert, oriented to name, place and time. Cranial nerves II-XII are grossly intact. Gait is normal.   Neuropsychiatric: Calm, cooperative. Normal affect.     Labs: Not indicated.   EKG: Not indicated.   MR right knee 10/19/18    Impression:    1. Postsurgical changes of right knee proximal tibial opening wedge    osteotomy with surgical screws and plate along the lateral aspect of    the right proximal tibia, much better evaluated on plain radiographs    in the comparison CT scan.    2. Small right knee joint effusion.    3. Focal area of full-thickness cartilage loss along the central to    lateral trochlear surface at the patellofemoral joint, measuring    approximately 7 mm.    4. Areas of full-thickness cartilage loss in the medial femorotibial    joint compartment with subchondral edema.    5. Postsurgical changes of prior microfracture seen in the medial    femoral condyle.    6. Posterior changes of prior partial medial meniscectomy with minimal    body and posterior horn remaining.    7. The posterior cruciate ligament is not well seen, likely    chronically torn.     8. The lateral meniscus, medial and lateral supporting structures, and    anterior cruciate ligament are intact.    9. No full-thickness cartilage loss in the lateral femorotibial joint    compartment.    Xray right knee 10/1/18    IMPRESSION: Postsurgical changes of a right knee proximal tibial    opening wedge osteotomy with varus, valgus, posterior stress views    performed per orthopedic injury, as above.    US right lower leg 4/12/18    Impression: Ultrasound of the right lower extremity is negative  for     deep venous thrombosis.    Imaging reviewed by this provider    Outside records reviewed from: Care Everywhere    ASSESSMENT and PLAN  Kirit Logan is a 33 year old male scheduled to undergo 1) Right tibial plate removal  2) Right high tibial osteotomy  3) Right Harleen Spatial Frame with Dr. Stallworth on 12/21/18. He has the following specific operative considerations:   - RCRI : No serious cardiac risks.    - Anesthesia considerations:  Refer to PAC assessment in anesthesia records  - VTE risk: 1.8%  - AGUSTIN # of risks 1/8 = Low risk   - Risk of PONV score = 2.  If > 2, anti-emetic intervention recommended.     --Persistent right knee pain and leg deformity after surgery. Above procedure now planned with spinal. Patient comfortable with plan.      --Generally healthy male with no significant cardiopulmonary history. Nonsmoker. Good activity tolerance.        --History of post op DVT right lower extremity after surgery. Was anticoagulated for a time.      --Pain management. Discussed possibility of nerve block if appropriate for patient's procedure. Dr. Stallworth is requesting adductor nerve block. Final counseling and decisions by regional team on DOS.    Arrival time, NPO, shower and medication instructions provided by nursing staff today. Preparing For Your Surgery handout given.      Patient was reviewed by Dr Hardin.    ARAM Bird CNS  Preoperative Assessment Center  [facility]  Clinic and [facility] Center  Phone: [phone]  Fax: [phone]

## 2018-12-13 NOTE — PATIENT INSTRUCTIONS
Preparing for Your Surgery      Name:  Kirit Logan   MRN:  [id number]   :  1985   Today's Date:  2018     Arriving for surgery:  Surgery date:  18  Arrival time:  8:40 am    Please come to:  [facility] [address]  [address] [place]  [address]    - parking is available in front of [facility] from 5:15AM to 8:00PM. If you prefer, park your car in the Green Lot.    -Proceed to the 3rd floor, check in at the Adult Surgery Waiting Lounge. [phone]    If an escort is needed stop at the Information Desk in the lobby. Inform the information person that you are here for surgery. An escort to the Adult Surgery Waiting Lounge will be provided.    -  Bring your ID and insurance card.    What can I eat or drink?  -  You may have solid food or milk products until 8 hours prior to your surgery. (Until 3 am )  -  You may have water, apple juice or 7up/Sprite until 2 hours prior to your surgery. (Until 8:40 am- Stop on arrival to hospital )    Which medicines can I take?       -  Do not bring your own medications to the hospital.        -  Follow Orthopedic Clinic instructions regarding Ibuprofen. If no instructions given, NO Ibuprofen the day prior to surgery.     -  Please take these medications the morning of surgery:  Acetaminophen (Tylenol) if needed    How do I prepare myself?  -  Take two showers: one the night before surgery; and one the morning of surgery.         Use Scrubcare or Hibiclens to wash from neck down.  You may use your own shampoo and conditioner. No other hair products.   -  Do NOT use lotion, powder, colognes, deodorant, or antiperspirant the day of your surgery.  -  Do NOT wear any jewelry.    Questions or Concerns:  If you have questions or concerns prior to your surgery, call [phone]. (Mon - Fri   8 am- 5:30 pm)  Questions after surgery, contact your Surgeons office.      AFTER YOUR SURGERY  Breathing exercises    Breathing exercises help you recover faster. Take deep breaths and let the air out slowly. This will:     Help you wake up after surgery.    Help prevent complications like pneumonia.  Preventing complications will help you go home sooner.   We may give you a breathing device (incentive spirometer) to encourage you to breathe deeply.   Nausea and vomiting   You may feel sick to your stomach after surgery; if so, let your nurse know.    Pain control:  After surgery, you may have pain. Our goal is to help you manage your pain. Pain medicine will help you feel comfortable enough to do activities that will help you heal.  These activities may include breathing exercises, walking and physical therapy.   To help your health care team treat your pain we will ask: 1) If you have pain  2) where it is located 3) describe your pain in your words  Methods of pain control include medications given by mouth, vein or by nerve block for some surgeries.  We may give you a pain control pump that will:  1) Deliver the medicine through a tube placed in your vein  2) Control the amount of medicine you receive  3) Allow you to push a button to deliver a dose of pain medicine  Sequential Compression Device (SCD) or Pneumo Boots:  You may need to wear SCD S on your legs or feet. These are wraps connected to a machine that pumps in air and releases it. The repeated pumping helps prevent blood clots from forming.

## 2018-12-14 NOTE — ADDENDUM NOTE
Addendum  created 12/14/18 0803 by Caterina Alberto RN    Patient Education assessment filed, Patient Education documented on, Patient Education title added, Visit Navigator Flowsheet section accepted

## 2018-12-21 ENCOUNTER — APPOINTMENT (OUTPATIENT)
Dept: GENERAL RADIOLOGY | Facility: CLINIC | Age: 33
DRG: 482 | End: 2018-12-21
Attending: ORTHOPAEDIC SURGERY
Payer: COMMERCIAL

## 2018-12-21 ENCOUNTER — HOSPITAL ENCOUNTER (INPATIENT)
Facility: CLINIC | Age: 33
LOS: 1 days | Discharge: HOME | DRG: 482 | End: 2018-12-22
Attending: ORTHOPAEDIC SURGERY | Admitting: ORTHOPAEDIC SURGERY
Payer: COMMERCIAL

## 2018-12-21 ENCOUNTER — ANESTHESIA (OUTPATIENT)
Dept: SURGERY | Facility: CLINIC | Age: 33
DRG: 482 | End: 2018-12-21
Payer: COMMERCIAL

## 2018-12-21 ENCOUNTER — APPOINTMENT (OUTPATIENT)
Dept: GENERAL RADIOLOGY | Facility: CLINIC | Age: 33
DRG: 482 | End: 2018-12-21
Attending: STUDENT IN AN ORGANIZED HEALTH CARE EDUCATION/TRAINING PROGRAM
Payer: COMMERCIAL

## 2018-12-21 DIAGNOSIS — M21.969 TIBIAL DEFORMITY, ACQUIRED: Primary | ICD-10-CM

## 2018-12-21 LAB
ABO + RH BLD: NORMAL
ABO + RH BLD: NORMAL
ALBUMIN UR-MCNC: NEGATIVE MG/DL
APPEARANCE UR: CLEAR
BILIRUB UR QL STRIP: NEGATIVE
BLD GP AB SCN SERPL QL: NORMAL
BLOOD BANK CMNT PATIENT-IMP: NORMAL
COLOR UR AUTO: YELLOW
GLUCOSE SERPL-MCNC: 87 MG/DL (ref 70–99)
GLUCOSE UR STRIP-MCNC: NEGATIVE MG/DL
HGB BLD-MCNC: 13.7 G/DL (ref 13.3–17.7)
HGB UR QL STRIP: NEGATIVE
KETONES UR STRIP-MCNC: NEGATIVE MG/DL
LEUKOCYTE ESTERASE UR QL STRIP: NEGATIVE
NITRATE UR QL: NEGATIVE
PH UR STRIP: 6.5 PH (ref 5–7)
RBC #/AREA URNS AUTO: <1 /HPF (ref 0–2)
SOURCE: NORMAL
SP GR UR STRIP: 1.02 (ref 1–1.03)
SPECIMEN EXP DATE BLD: NORMAL
UROBILINOGEN UR STRIP-MCNC: NORMAL MG/DL (ref 0–2)
WBC #/AREA URNS AUTO: <1 /HPF (ref 0–5)

## 2018-12-21 PROCEDURE — 25000128 H RX IP 250 OP 636: Performed by: STUDENT IN AN ORGANIZED HEALTH CARE EDUCATION/TRAINING PROGRAM

## 2018-12-21 PROCEDURE — 85018 HEMOGLOBIN: CPT | Performed by: ANESTHESIOLOGY

## 2018-12-21 PROCEDURE — 25000566 ZZH SEVOFLURANE, EA 15 MIN: Performed by: ORTHOPAEDIC SURGERY

## 2018-12-21 PROCEDURE — 25000125 ZZHC RX 250: Performed by: ORTHOPAEDIC SURGERY

## 2018-12-21 PROCEDURE — 0QPG04Z REMOVAL OF INTERNAL FIXATION DEVICE FROM RIGHT TIBIA, OPEN APPROACH: ICD-10-PCS | Performed by: ORTHOPAEDIC SURGERY

## 2018-12-21 PROCEDURE — 86901 BLOOD TYPING SEROLOGIC RH(D): CPT | Performed by: ANESTHESIOLOGY

## 2018-12-21 PROCEDURE — 25000128 H RX IP 250 OP 636: Performed by: ANESTHESIOLOGY

## 2018-12-21 PROCEDURE — 37000008 ZZH ANESTHESIA TECHNICAL FEE, 1ST 30 MIN: Performed by: ORTHOPAEDIC SURGERY

## 2018-12-21 PROCEDURE — 40000170 ZZH STATISTIC PRE-PROCEDURE ASSESSMENT II: Performed by: ORTHOPAEDIC SURGERY

## 2018-12-21 PROCEDURE — 86900 BLOOD TYPING SEROLOGIC ABO: CPT | Performed by: ANESTHESIOLOGY

## 2018-12-21 PROCEDURE — 25000125 ZZHC RX 250: Performed by: NURSE ANESTHETIST, CERTIFIED REGISTERED

## 2018-12-21 PROCEDURE — 27210794 ZZH OR GENERAL SUPPLY STERILE: Performed by: ORTHOPAEDIC SURGERY

## 2018-12-21 PROCEDURE — 40000986 XR TIBIA & FIBULA RT 2 VW: Mod: RT

## 2018-12-21 PROCEDURE — 25000128 H RX IP 250 OP 636: Performed by: NURSE ANESTHETIST, CERTIFIED REGISTERED

## 2018-12-21 PROCEDURE — 25000125 ZZHC RX 250: Performed by: ANESTHESIOLOGY

## 2018-12-21 PROCEDURE — 27211024 ZZHC OR SUPPLY OTHER OPNP: Performed by: ORTHOPAEDIC SURGERY

## 2018-12-21 PROCEDURE — C1713 ANCHOR/SCREW BN/BN,TIS/BN: HCPCS | Performed by: ORTHOPAEDIC SURGERY

## 2018-12-21 PROCEDURE — 71000014 ZZH RECOVERY PHASE 1 LEVEL 2 FIRST HR: Performed by: ORTHOPAEDIC SURGERY

## 2018-12-21 PROCEDURE — C9290 INJ, BUPIVACAINE LIPOSOME: HCPCS | Performed by: ANESTHESIOLOGY

## 2018-12-21 PROCEDURE — 12000001 ZZH R&B MED SURG/OB UMMC

## 2018-12-21 PROCEDURE — 82947 ASSAY GLUCOSE BLOOD QUANT: CPT | Performed by: ANESTHESIOLOGY

## 2018-12-21 PROCEDURE — 0QS63CZ REPOSITION RIGHT UPPER FEMUR WITH RING EXTERNAL FIXATION DEVICE, PERCUTANEOUS APPROACH: ICD-10-PCS | Performed by: ORTHOPAEDIC SURGERY

## 2018-12-21 PROCEDURE — 37000009 ZZH ANESTHESIA TECHNICAL FEE, EACH ADDTL 15 MIN: Performed by: ORTHOPAEDIC SURGERY

## 2018-12-21 PROCEDURE — 81001 URINALYSIS AUTO W/SCOPE: CPT | Performed by: ORTHOPAEDIC SURGERY

## 2018-12-21 PROCEDURE — 27810169 ZZH OR IMPLANT GENERAL: Performed by: ORTHOPAEDIC SURGERY

## 2018-12-21 PROCEDURE — 36415 COLL VENOUS BLD VENIPUNCTURE: CPT | Performed by: ANESTHESIOLOGY

## 2018-12-21 PROCEDURE — 25000132 ZZH RX MED GY IP 250 OP 250 PS 637: Performed by: STUDENT IN AN ORGANIZED HEALTH CARE EDUCATION/TRAINING PROGRAM

## 2018-12-21 PROCEDURE — 25000128 H RX IP 250 OP 636: Performed by: ORTHOPAEDIC SURGERY

## 2018-12-21 PROCEDURE — 40000278 XR SURGERY CARM FLUORO LESS THAN 5 MIN: Mod: TC

## 2018-12-21 PROCEDURE — 36000066 ZZH SURGERY LEVEL 4 W FLUORO 1ST 30 MIN - UMMC: Performed by: ORTHOPAEDIC SURGERY

## 2018-12-21 PROCEDURE — 36000064 ZZH SURGERY LEVEL 4 EA 15 ADDTL MIN - UMMC: Performed by: ORTHOPAEDIC SURGERY

## 2018-12-21 PROCEDURE — 86850 RBC ANTIBODY SCREEN: CPT | Performed by: ANESTHESIOLOGY

## 2018-12-21 DEVICE — IMPLANTABLE DEVICE
Type: IMPLANTABLE DEVICE | Site: LEG | Status: NON-FUNCTIONAL
Removed: 2019-03-14

## 2018-12-21 DEVICE — IMP EXTERNAL FIXATOR ILIZAROV WIRE W/STPR 1.8X400MM 102107
Type: IMPLANTABLE DEVICE | Site: LEG | Status: NON-FUNCTIONAL
Removed: 2019-03-14

## 2018-12-21 RX ORDER — HYDROMORPHONE HYDROCHLORIDE 1 MG/ML
.3-.5 INJECTION, SOLUTION INTRAMUSCULAR; INTRAVENOUS; SUBCUTANEOUS EVERY 5 MIN PRN
Status: DISCONTINUED | OUTPATIENT
Start: 2018-12-21 | End: 2018-12-21 | Stop reason: HOSPADM

## 2018-12-21 RX ORDER — ONDANSETRON 2 MG/ML
4 INJECTION INTRAMUSCULAR; INTRAVENOUS EVERY 30 MIN PRN
Status: DISCONTINUED | OUTPATIENT
Start: 2018-12-21 | End: 2018-12-21 | Stop reason: HOSPADM

## 2018-12-21 RX ORDER — PROPOFOL 10 MG/ML
INJECTION, EMULSION INTRAVENOUS PRN
Status: DISCONTINUED | OUTPATIENT
Start: 2018-12-21 | End: 2018-12-21

## 2018-12-21 RX ORDER — HYDROXYZINE HYDROCHLORIDE 25 MG/1
25 TABLET, FILM COATED ORAL EVERY 6 HOURS PRN
Status: DISCONTINUED | OUTPATIENT
Start: 2018-12-21 | End: 2018-12-22 | Stop reason: HOSPADM

## 2018-12-21 RX ORDER — PROCHLORPERAZINE MALEATE 5 MG
10 TABLET ORAL EVERY 6 HOURS PRN
Status: DISCONTINUED | OUTPATIENT
Start: 2018-12-21 | End: 2018-12-22 | Stop reason: HOSPADM

## 2018-12-21 RX ORDER — ASPIRIN 325 MG
325 TABLET ORAL DAILY
Status: DISCONTINUED | OUTPATIENT
Start: 2018-12-21 | End: 2018-12-22 | Stop reason: HOSPADM

## 2018-12-21 RX ORDER — FENTANYL CITRATE 50 UG/ML
INJECTION, SOLUTION INTRAMUSCULAR; INTRAVENOUS PRN
Status: DISCONTINUED | OUTPATIENT
Start: 2018-12-21 | End: 2018-12-21

## 2018-12-21 RX ORDER — METOCLOPRAMIDE HYDROCHLORIDE 5 MG/ML
10 INJECTION INTRAMUSCULAR; INTRAVENOUS EVERY 6 HOURS PRN
Status: DISCONTINUED | OUTPATIENT
Start: 2018-12-21 | End: 2018-12-22 | Stop reason: HOSPADM

## 2018-12-21 RX ORDER — AMOXICILLIN 250 MG
2 CAPSULE ORAL 2 TIMES DAILY
Status: DISCONTINUED | OUTPATIENT
Start: 2018-12-21 | End: 2018-12-22 | Stop reason: HOSPADM

## 2018-12-21 RX ORDER — ONDANSETRON 4 MG/1
4 TABLET, ORALLY DISINTEGRATING ORAL EVERY 30 MIN PRN
Status: DISCONTINUED | OUTPATIENT
Start: 2018-12-21 | End: 2018-12-21 | Stop reason: HOSPADM

## 2018-12-21 RX ORDER — ONDANSETRON 2 MG/ML
INJECTION INTRAMUSCULAR; INTRAVENOUS PRN
Status: DISCONTINUED | OUTPATIENT
Start: 2018-12-21 | End: 2018-12-21

## 2018-12-21 RX ORDER — FENTANYL CITRATE 50 UG/ML
25-50 INJECTION, SOLUTION INTRAMUSCULAR; INTRAVENOUS
Status: DISCONTINUED | OUTPATIENT
Start: 2018-12-21 | End: 2018-12-21 | Stop reason: HOSPADM

## 2018-12-21 RX ORDER — SODIUM CHLORIDE, SODIUM LACTATE, POTASSIUM CHLORIDE, CALCIUM CHLORIDE 600; 310; 30; 20 MG/100ML; MG/100ML; MG/100ML; MG/100ML
INJECTION, SOLUTION INTRAVENOUS CONTINUOUS
Status: DISCONTINUED | OUTPATIENT
Start: 2018-12-21 | End: 2018-12-21 | Stop reason: HOSPADM

## 2018-12-21 RX ORDER — CEFAZOLIN SODIUM 2 G/100ML
2 INJECTION, SOLUTION INTRAVENOUS
Status: COMPLETED | OUTPATIENT
Start: 2018-12-21 | End: 2018-12-21

## 2018-12-21 RX ORDER — SODIUM CHLORIDE, SODIUM LACTATE, POTASSIUM CHLORIDE, CALCIUM CHLORIDE 600; 310; 30; 20 MG/100ML; MG/100ML; MG/100ML; MG/100ML
INJECTION, SOLUTION INTRAVENOUS CONTINUOUS PRN
Status: DISCONTINUED | OUTPATIENT
Start: 2018-12-21 | End: 2018-12-21

## 2018-12-21 RX ORDER — LIDOCAINE 40 MG/G
CREAM TOPICAL
Status: DISCONTINUED | OUTPATIENT
Start: 2018-12-21 | End: 2018-12-22 | Stop reason: HOSPADM

## 2018-12-21 RX ORDER — SODIUM CHLORIDE 9 MG/ML
INJECTION, SOLUTION INTRAVENOUS CONTINUOUS
Status: DISCONTINUED | OUTPATIENT
Start: 2018-12-21 | End: 2018-12-22 | Stop reason: HOSPADM

## 2018-12-21 RX ORDER — MEPERIDINE HYDROCHLORIDE 25 MG/ML
12.5 INJECTION INTRAMUSCULAR; INTRAVENOUS; SUBCUTANEOUS EVERY 5 MIN PRN
Status: DISCONTINUED | OUTPATIENT
Start: 2018-12-21 | End: 2018-12-21 | Stop reason: HOSPADM

## 2018-12-21 RX ORDER — AMOXICILLIN 250 MG
1 CAPSULE ORAL 2 TIMES DAILY
Status: DISCONTINUED | OUTPATIENT
Start: 2018-12-21 | End: 2018-12-22 | Stop reason: HOSPADM

## 2018-12-21 RX ORDER — FLUMAZENIL 0.1 MG/ML
0.2 INJECTION, SOLUTION INTRAVENOUS
Status: DISCONTINUED | OUTPATIENT
Start: 2018-12-21 | End: 2018-12-21 | Stop reason: HOSPADM

## 2018-12-21 RX ORDER — NALOXONE HYDROCHLORIDE 0.4 MG/ML
.1-.4 INJECTION, SOLUTION INTRAMUSCULAR; INTRAVENOUS; SUBCUTANEOUS
Status: ACTIVE | OUTPATIENT
Start: 2018-12-21 | End: 2018-12-22

## 2018-12-21 RX ORDER — LIDOCAINE HYDROCHLORIDE 20 MG/ML
INJECTION, SOLUTION INFILTRATION; PERINEURAL PRN
Status: DISCONTINUED | OUTPATIENT
Start: 2018-12-21 | End: 2018-12-21

## 2018-12-21 RX ORDER — METOCLOPRAMIDE 10 MG/1
10 TABLET ORAL EVERY 6 HOURS PRN
Status: DISCONTINUED | OUTPATIENT
Start: 2018-12-21 | End: 2018-12-22 | Stop reason: HOSPADM

## 2018-12-21 RX ORDER — CEFAZOLIN SODIUM 1 G/3ML
1 INJECTION, POWDER, FOR SOLUTION INTRAMUSCULAR; INTRAVENOUS EVERY 8 HOURS
Status: COMPLETED | OUTPATIENT
Start: 2018-12-21 | End: 2018-12-22

## 2018-12-21 RX ORDER — BUPIVACAINE HYDROCHLORIDE AND EPINEPHRINE 5; 5 MG/ML; UG/ML
INJECTION, SOLUTION PERINEURAL PRN
Status: DISCONTINUED | OUTPATIENT
Start: 2018-12-21 | End: 2018-12-21 | Stop reason: HOSPADM

## 2018-12-21 RX ORDER — DEXAMETHASONE SODIUM PHOSPHATE 4 MG/ML
INJECTION, SOLUTION INTRA-ARTICULAR; INTRALESIONAL; INTRAMUSCULAR; INTRAVENOUS; SOFT TISSUE PRN
Status: DISCONTINUED | OUTPATIENT
Start: 2018-12-21 | End: 2018-12-21

## 2018-12-21 RX ORDER — ONDANSETRON 4 MG/1
4 TABLET, ORALLY DISINTEGRATING ORAL EVERY 6 HOURS PRN
Status: DISCONTINUED | OUTPATIENT
Start: 2018-12-21 | End: 2018-12-22 | Stop reason: HOSPADM

## 2018-12-21 RX ORDER — NALOXONE HYDROCHLORIDE 0.4 MG/ML
.1-.4 INJECTION, SOLUTION INTRAMUSCULAR; INTRAVENOUS; SUBCUTANEOUS
Status: DISCONTINUED | OUTPATIENT
Start: 2018-12-21 | End: 2018-12-21 | Stop reason: HOSPADM

## 2018-12-21 RX ORDER — ACETAMINOPHEN 325 MG/1
975 TABLET ORAL EVERY 8 HOURS
Status: DISCONTINUED | OUTPATIENT
Start: 2018-12-21 | End: 2018-12-22 | Stop reason: HOSPADM

## 2018-12-21 RX ORDER — BUPIVACAINE HYDROCHLORIDE AND EPINEPHRINE 2.5; 5 MG/ML; UG/ML
INJECTION, SOLUTION INFILTRATION; PERINEURAL PRN
Status: DISCONTINUED | OUTPATIENT
Start: 2018-12-21 | End: 2018-12-21

## 2018-12-21 RX ORDER — ACETAMINOPHEN 325 MG/1
650 TABLET ORAL EVERY 4 HOURS PRN
Status: DISCONTINUED | OUTPATIENT
Start: 2018-12-24 | End: 2018-12-22 | Stop reason: HOSPADM

## 2018-12-21 RX ORDER — CEFAZOLIN SODIUM 1 G/3ML
1 INJECTION, POWDER, FOR SOLUTION INTRAMUSCULAR; INTRAVENOUS SEE ADMIN INSTRUCTIONS
Status: DISCONTINUED | OUTPATIENT
Start: 2018-12-21 | End: 2018-12-21 | Stop reason: HOSPADM

## 2018-12-21 RX ORDER — ONDANSETRON 2 MG/ML
4 INJECTION INTRAMUSCULAR; INTRAVENOUS EVERY 6 HOURS PRN
Status: DISCONTINUED | OUTPATIENT
Start: 2018-12-21 | End: 2018-12-22 | Stop reason: HOSPADM

## 2018-12-21 RX ORDER — NALOXONE HYDROCHLORIDE 0.4 MG/ML
.1-.4 INJECTION, SOLUTION INTRAMUSCULAR; INTRAVENOUS; SUBCUTANEOUS
Status: DISCONTINUED | OUTPATIENT
Start: 2018-12-21 | End: 2018-12-22 | Stop reason: HOSPADM

## 2018-12-21 RX ORDER — OXYCODONE HYDROCHLORIDE 5 MG/1
5-10 TABLET ORAL
Status: DISCONTINUED | OUTPATIENT
Start: 2018-12-21 | End: 2018-12-22 | Stop reason: HOSPADM

## 2018-12-21 RX ADMIN — OXYCODONE HYDROCHLORIDE 10 MG: 5 TABLET ORAL at 17:45

## 2018-12-21 RX ADMIN — PROPOFOL 200 MG: 10 INJECTION, EMULSION INTRAVENOUS at 08:49

## 2018-12-21 RX ADMIN — Medication 0.3 MG: at 11:32

## 2018-12-21 RX ADMIN — OXYCODONE HYDROCHLORIDE 10 MG: 5 TABLET ORAL at 21:47

## 2018-12-21 RX ADMIN — OXYCODONE HYDROCHLORIDE 5 MG: 5 TABLET ORAL at 13:36

## 2018-12-21 RX ADMIN — CEFAZOLIN SODIUM 1 G: 1 INJECTION, POWDER, FOR SOLUTION INTRAMUSCULAR; INTRAVENOUS at 17:45

## 2018-12-21 RX ADMIN — FENTANYL CITRATE 100 MCG: 50 INJECTION, SOLUTION INTRAMUSCULAR; INTRAVENOUS at 08:50

## 2018-12-21 RX ADMIN — ROCURONIUM BROMIDE 50 MG: 10 INJECTION INTRAVENOUS at 08:50

## 2018-12-21 RX ADMIN — DEXAMETHASONE SODIUM PHOSPHATE 6 MG: 4 INJECTION, SOLUTION INTRAMUSCULAR; INTRAVENOUS at 09:06

## 2018-12-21 RX ADMIN — PHENYLEPHRINE HYDROCHLORIDE 50 MCG: 10 INJECTION, SOLUTION INTRAMUSCULAR; INTRAVENOUS; SUBCUTANEOUS at 10:17

## 2018-12-21 RX ADMIN — CEFAZOLIN SODIUM 2 G: 2 INJECTION, SOLUTION INTRAVENOUS at 09:03

## 2018-12-21 RX ADMIN — HYDROMORPHONE HYDROCHLORIDE 0.2 MG: 1 INJECTION, SOLUTION INTRAMUSCULAR; INTRAVENOUS; SUBCUTANEOUS at 10:56

## 2018-12-21 RX ADMIN — ASPIRIN 325 MG ORAL TABLET 325 MG: 325 PILL ORAL at 13:36

## 2018-12-21 RX ADMIN — OXYCODONE HYDROCHLORIDE 5 MG: 5 TABLET ORAL at 16:05

## 2018-12-21 RX ADMIN — BUPIVACAINE HYDROCHLORIDE AND EPINEPHRINE BITARTRATE 5 ML: 2.5; .005 INJECTION, SOLUTION INFILTRATION; PERINEURAL at 07:25

## 2018-12-21 RX ADMIN — Medication 0.4 MG: at 11:41

## 2018-12-21 RX ADMIN — MIDAZOLAM 1 MG: 1 INJECTION INTRAMUSCULAR; INTRAVENOUS at 09:08

## 2018-12-21 RX ADMIN — Medication 0.3 MG: at 11:25

## 2018-12-21 RX ADMIN — Medication 0.5 MG: at 11:53

## 2018-12-21 RX ADMIN — HYDROMORPHONE HYDROCHLORIDE 0.2 MG: 1 INJECTION, SOLUTION INTRAMUSCULAR; INTRAVENOUS; SUBCUTANEOUS at 10:36

## 2018-12-21 RX ADMIN — MIDAZOLAM HYDROCHLORIDE 2 MG: 1 INJECTION, SOLUTION INTRAMUSCULAR; INTRAVENOUS at 07:15

## 2018-12-21 RX ADMIN — LIDOCAINE HYDROCHLORIDE 100 MG: 20 INJECTION, SOLUTION INFILTRATION; PERINEURAL at 08:49

## 2018-12-21 RX ADMIN — HYDROMORPHONE HYDROCHLORIDE 0.3 MG: 1 INJECTION, SOLUTION INTRAMUSCULAR; INTRAVENOUS; SUBCUTANEOUS at 10:01

## 2018-12-21 RX ADMIN — SODIUM CHLORIDE, POTASSIUM CHLORIDE, SODIUM LACTATE AND CALCIUM CHLORIDE: 600; 310; 30; 20 INJECTION, SOLUTION INTRAVENOUS at 08:43

## 2018-12-21 RX ADMIN — ACETAMINOPHEN 975 MG: 325 TABLET, FILM COATED ORAL at 20:16

## 2018-12-21 RX ADMIN — ACETAMINOPHEN 975 MG: 325 TABLET, FILM COATED ORAL at 13:36

## 2018-12-21 RX ADMIN — SENNOSIDES AND DOCUSATE SODIUM 2 TABLET: 8.6; 5 TABLET ORAL at 20:16

## 2018-12-21 RX ADMIN — BUPIVACAINE 10 ML: 13.3 INJECTION, SUSPENSION, LIPOSOMAL INFILTRATION at 07:25

## 2018-12-21 RX ADMIN — ONDANSETRON 4 MG: 2 INJECTION INTRAMUSCULAR; INTRAVENOUS at 10:40

## 2018-12-21 RX ADMIN — SUGAMMADEX 160 MG: 100 INJECTION, SOLUTION INTRAVENOUS at 10:45

## 2018-12-21 RX ADMIN — PHENYLEPHRINE HYDROCHLORIDE 50 MCG: 10 INJECTION, SOLUTION INTRAMUSCULAR; INTRAVENOUS; SUBCUTANEOUS at 10:14

## 2018-12-21 RX ADMIN — SODIUM CHLORIDE: 9 INJECTION, SOLUTION INTRAVENOUS at 14:07

## 2018-12-21 ASSESSMENT — ACTIVITIES OF DAILY LIVING (ADL)
SWALLOWING: 0-->SWALLOWS FOODS/LIQUIDS WITHOUT DIFFICULTY
ADLS_ACUITY_SCORE: 11
BATHING: 0-->INDEPENDENT
TRANSFERRING: 0-->INDEPENDENT
FALL_HISTORY_WITHIN_LAST_SIX_MONTHS: NO
TOILETING: 1-->ASSISTIVE EQUIPMENT
DRESS: 2-->ASSISTIVE PERSON
AMBULATION: 1-->ASSISTIVE EQUIPMENT
RETIRED_EATING: 0-->INDEPENDENT
RETIRED_COMMUNICATION: 0-->UNDERSTANDS/COMMUNICATES WITHOUT DIFFICULTY
COGNITION: 0 - NO COGNITION ISSUES REPORTED
ADLS_ACUITY_SCORE: 14

## 2018-12-21 ASSESSMENT — MIFFLIN-ST. JEOR: SCORE: 1748.25

## 2018-12-21 NOTE — OP NOTE
Operation:   1.  Removal of hardware right proximal tibia  2.  Drill corticotomy right proximal tibial metaphysis  3.  Application of Harleen spatial frame for gradual correction of tibial deformity    Indication: Kirit had previously undergone a lateral closing wedge high tibial osteotomy.  He presented with 15 degrees of valgus and severe lateral compartment symptoms.  The plan was for gradual correction of his alignment to 3 degrees valgus of the mechanical axis, to remove some of the load being placed on his lateral side but not overload his already degenerative medial compartment.    Surgeon: Dr. Shaheed Stallworth MD    Assistant: Dr. Brian Huang MD    Description:   Patient was taken to the operating room, positioned supine and delivered a general anesthetic.  After routine prep and draping a surgical timeout was performed with all present being in agreement regarding the stated procedure    1. Removal of Hardware  The previous lateral incision was utilized to access the tibial plateau plate and screws.  All screws were removed intact as was the plate.  Following this removal of the wound was irrigated and the fascia closed with 2-0 Vicryl suture followed by a subcutaneous 2-0 Vicryl and 3-0 Monocryl for skin.    2. Drill corticotomy right proximal tibial metaphysis  Attention was then turned to the corrective part of the procedure.  Through a separate incision, we used a small periosteal elevator to elevate the periosteum off the tibia at the planned corticotomy site which had been demonstrated on x-ray imaging.  A mini Hohmann retractor was placed both medially and laterally underneath the periosteum.  Following this a 4.8 mm drill was used to make a total of 8 holes in the tibia, using x-ray guidance throughout to ensure the appropriate position.  The corticotomy was not completed until the frame had been applied.    3. Application of Harleen Spatial Frame    Preoperatively a TSF was constructed using 155 mm  "rings.  A 5/8 ring was used proximally and a full ring was used distally.  6 standard long struts were placed, and then modified such that the frame reflected the valgus deformity of the proximal tibia.    The frame was slid onto the leg and positioned, using bumps to ensure the leg was \"suspended\" in the correct position and the frame.  Minor adjustments to strut length were performed such that the frame aligned with the tibial deformity.    A proximal reference wire was then placed.  The wire was initially taken freehand and using x-ray guidance punctured the skin at the correct point with the correct vector of the wire.  After being placed on bone the drill was attached taking care not to abut the olive on the wire.  The wire was then passed, such that it was parallel to the joint surface in the coronal plane.  After adjusting its path through the skin on the medial side such that the skin was not tethered, the olive  was placed firmly against the lateral bony surface.  Open wrenches were used to to secure the wire to the frame with a wire fixation bolt and nut on the lateral side.  Following this, a tensiometer was used to tension the wire to 110 kg from the medial aspect, after which this fixation bolt and nut was tightened with the slotted wrenches.  This aligned the proximal ring with the proximal tibia.    Attention was then turned to the distal ring.  Careful adjustments to strut length were made such that the ring was orthogonal to the axis of the tibia.  A 2 hole male cube was attached with a nut to the ring.  The drill sleeve and trocar was then used to locate the correct skin entry point for a half pin, a skin incision was made, and a course of the half pin drilled, with care being taken not to apply excessive force that would change the angle of the drill trajectory.  An HA-coated half pin of 6 mm x  30 mm length was used here, and a sleeve and screw was used to secure the pin to the frame.     2 more " HA-coated half pins were placed through a 4 hole Rancho from the distal ring, aiming to obtain maximum angle between pins.  Proximally 1 further wire was placed from posteromedial to anterior laterally, and tensioned in the same fashion.  A 60 mm HA-coated half pins was also placed proximally, taking care not to interfere with the wire fixation.    Once this had been done and the final aligned attention was returned to the corticotomy site.  All 6 struts were detached from the proximal ring.  A 1 cm osteotome was introduced into the corticotomy site and under x-ray guidance,  Passes anteriorly,  in the midline and then down the lateral cortex were made with the osteotome to complete the corticotomy.  The corticotomy was felt to be complete on twisting of the osteotome, and this was confirmed with x-ray visualization.  The struts were reattached to their respective shoulder bolts, and 4 mm of compression was applied circumferentially.  Strut lengths were then recorded.    We then systematically checked each of the connections on the frame to ensure that were tight.  And then checked each wire and pin site to ensure that any tensioned skin was released.  Bacitracin, Adaptic and square sponges were then applied to each of the pin sites, and secured with the standard white and red holders.  The sharp ends of the wires and the cut ends of the half pins were also covered with the usual plastic covers.    Kerlix fluffs were then packed into the frame to apply some compression at the site of the wound, and a Tubigrip was placed over the entire frame.  The patient returned to PACU in good condition.    Post Op: Postoperative x-rays will be reviewed carefully to plan the tibial correction.  This will be documented in the notes once it has been performed.  Ikrit is nonweightbearing on the right leg and will be taking aspirin for DVT prophylaxis.      - Dr. Brian Huang (Orthopaedic Fellow)

## 2018-12-21 NOTE — ANESTHESIA PROCEDURE NOTES
Peripheral Nerve Block Procedure Note    Staff:     Anesthesiologist:  Teodoro Macedo MD    Resident/CRNA:  Jaqueline Washburn MD    Block performed by resident/CRNA in the presence of a teaching physician    Location: Pre-op  Procedure Start/Stop TImes:      12/21/2018 7:15 AM     12/21/2018 7:25 AM    patient identified, IV checked, site marked, risks and benefits discussed, informed consent, monitors and equipment checked, pre-op evaluation, at physician/surgeon's request and post-op pain management      Correct Patient: Yes      Correct Position: Yes      Correct Site: Yes      Correct Procedure: Yes      Correct Laterality:  Yes    Site Marked:  Yes  Procedure details:     Procedure:  Adductor canal    ASA:  2    Diagnosis:  Right Tibial Osteotomy    Laterality:  Right    Position:  Supine    Sterile Prep: chloraprep, mask and sterile gloves      Local skin infiltration:  2% lidocaine    amount (mL):  3    Needle:  Short bevel    Needle gauge:  21    Needle length (mm):  100    Ultrasound: Yes      Ultrasound used to identify targeted nerve, plexus, or vascular structure and placed a needle adjacent to it      Permanent Image entered into patiient's record      Abnormal pain on injection: No      Blood Aspirated: No      Paresthesias:  No    Bleeding at site: No      Bolus via:  Needle    Infusion Method:  Single Shot    Complications:  None  Assessment/Narrative:      Right Adductor Canal Block performed under real time US. 10 ml Exparel 1.3% and 5 ml Bupivacaine 0.25% with Epi injected. No complications seen.

## 2018-12-21 NOTE — OR NURSING
PACU to Inpatient Nursing Handoff    Patient Kirit Logan is a 33 year old male who speaks English.   Procedure Procedure(s):  Right Tibial Plate Removal  RIGHT HIGH TIBIAL OSTEOTOMY,RIGHT GERRY SPATIAL FRAME  APPLY EXTERNAL FIXATOR ILIZAROV (LOCATION)   Surgeon(s) Primary: Shaheed Stallworth MD     No Known Allergies    Isolation  [unfilled]     Past Medical History   has a past medical history of History of DVT (deep vein thrombosis) and iamSPRAIN CRUCIATE LIG KNEE (5/26/2006).    Anesthesia Other   Dermatome Level     Preop Meds Not applicable   Nerve block Adductor canal.  Location:right. Med:Exparel (liposomal bupivacaine). Time given: 0725   Intraop Meds dexamethasone (Decadron)  fentanyl (Sublimaze): 100 mcg total  hydromorphone (Dilaudid): 0.7 mg total  ondansetron (Zofran): last given at 1040   Local Meds Yes   Antibiotics cefazolin (Ancef) - last given at 0903     Pain Patient Currently in Pain: yes   PACU meds  hydromorphone (Dilaudid): 1.5 mg (total dose) last given at 1153    PCA / epidural No   Capnography     Telemetry ECG Rhythm: Normal sinus rhythm   Inpatient Telemetry Monitor Ordered? No        Labs Glucose Lab Results   Component Value Date    GLC 87 12/21/2018       Hgb Lab Results   Component Value Date    HGB 13.7 12/21/2018       INR No results found for: INR   PACU Imaging Completed     Wound/Incision Incision/Surgical Site 12/21/18 Right;Anterior;Lower Leg (Active)   Incision Assessment UT 12/21/2018 12:00 PM   Dressing Intervention Clean, dry, intact 12/21/2018 12:00 PM   Number of days: 0       Incision/Surgical Site 12/21/18 Lower;Right Leg (Active)   Incision Assessment UTV 12/21/2018 12:00 PM   Dressing Intervention Clean, dry, intact 12/21/2018 12:00 PM   Number of days: 0      CMS        Equipment Not applicable   Other LDA       IV Access Peripheral IV 12/21/18 Left Lower forearm (Active)   Site Assessment WDL 12/21/2018 12:00 PM   Line Status Infusing 12/21/2018 12:00 PM    Phlebitis Scale 0-->no symptoms 12/21/2018 12:00 PM   Infiltration Scale 0 12/21/2018 12:00 PM   Number of days: 0      Blood Products Not applicable EBL 50 mL   Intake/Output Date 12/21/18 0700 - 12/22/18 0659   Shift 4180-4627 0555-0008 2990-9838 24 Hour Total   INTAKE   I.V. 800   800   Shift Total(mL/kg) 800(10.04)   800(10.04)   OUTPUT   Blood 50   50   Shift Total(mL/kg) 50(0.63)   50(0.63)   Weight (kg) 79.7 79.7 79.7 79.7      Drains / Maharaj     Time of void PreOp Void Prior to Procedure: 0600 (12/21/18 0608)    PostOp      Diapered? No   Bladder Scan     PO    tolerating sips     Vitals    B/P: (!) 120/93  T: 97.8  F (36.6  C)    Temp src: Oral  P:  Pulse: 80 (12/21/18 1145)    Heart Rate: 79 (12/21/18 1145)     R: 14  O2:  SpO2: 100 %    O2 Device: Nasal cannula (12/21/18 1130)    Oxygen Delivery: 2 LPM (12/21/18 0735)         Family/support present significant other   Patient belongings     Patient transported on cart   DC meds/scripts (obs/outpt) Not applicable   Inpatient Pain Meds Released? yes       Special needs/considerations None   Tasks needing completion None       Sheri Marlow RN  ASCOM [id number]

## 2018-12-21 NOTE — BRIEF OP NOTE
[facility], [place]    Brief Operative Note    Pre-operative diagnosis: Excessive Genu Valgus, Status Post Osteotomy, Valgus Deformity Right Knee   Post-operative diagnosis * No post-op diagnosis entered *  Procedure: Procedure(s):  Right Tibial Plate Removal  RIGHT HIGH TIBIAL OSTEOTOMY,RIGHT GERRY SPATIAL FRAME  APPLY EXTERNAL FIXATOR ILIZAROV (LOCATION)  Surgeon: Surgeon(s) and Role:     * Shaheed Stallworth MD - Primary  Anesthesia: Other   Estimated blood loss: Minimal  Drains: None  Specimens: * No specimens in log *  Findings:   None.  Complications: None.  Implants: Materials Involved:  None  Grafts:  None    Plate and previous screws removed through previous incision, and closed  Drill corticotomy at pre planned level  Frame pre-constructed (155 x full + 155 x 5/8) - long struts x 6  Place on patient, suspent appropriately and adjust to match deformity  Reference Wire prox - olive lateral to distal to match joint line  Realign frame and adjust so correct distally, 1 x half pin 6 mm  Check in both planes  2nd wire proximally  2 further pins distally  Release all struts proximally  Complete corticotomy, demonstrate on XR  Reattach struts, add 4 mm compression  Close wound  Dress wounds and frame    POST OP  XR in PACU to plan for adjustments to frame  Aspirin 325 EC Daily  Non Weight Bearing      .

## 2018-12-21 NOTE — ANESTHESIA CARE TRANSFER NOTE
Patient: Kirit Logan    Procedure(s):  Right Tibial Plate Removal  RIGHT HIGH TIBIAL OSTEOTOMY,RIGHT GERRY SPATIAL FRAME  APPLY EXTERNAL FIXATOR ILIZAROV (LOCATION)    Diagnosis: Excessive Genu Valgus, Status Post Osteotomy, Valgus Deformity Right Knee   Diagnosis Additional Information: No value filed.    Anesthesia Type:   General     Note:  Airway :Face Mask  Patient transferred to:PACU  Comments: Patient is drowsy but easily arousable, in no distress. VSS, normothermic. IV is patent. Report to RN.Handoff Report: Identifed the Patient, Identified the Reponsible Provider, Reviewed the pertinent medical history, Discussed the surgical course, Reviewed Intra-OP anesthesia mangement and issues during anesthesia, Set expectations for post-procedure period and Allowed opportunity for questions and acknowledgement of understanding      Vitals: (Last set prior to Anesthesia Care Transfer)    CRNA VITALS  12/21/2018 1034 - 12/21/2018 1112      12/21/2018             NIBP:  128/31  (Abnormal)     Pulse:  91    Temp:  36.7  C (98.1  F)    SpO2:  100 %    Resp Rate (observed):  20    EKG:  Sinus rhythm                Electronically Signed By: ARAM Yang CRNA  December 21, 2018  11:12 AM

## 2018-12-21 NOTE — ANESTHESIA POSTPROCEDURE EVALUATION
Anesthesia POST Procedure Evaluation    Patient: Kirit Logan   MRN:     [id number] Gender:   male   Age:    33 year old :      1985        Preoperative Diagnosis: Excessive Genu Valgus, Status Post Osteotomy, Valgus Deformity Right Knee    Procedure(s):  Right Tibial Plate Removal  RIGHT HIGH TIBIAL OSTEOTOMY,RIGHT GERRY SPATIAL FRAME  APPLY EXTERNAL FIXATOR ILIZAROV (LOCATION)   Postop Comments: No value filed.       Anesthesia Type:  General    Reportable Event: NO     PAIN: Uncomplicated   Sign Out status: Comfortable, Well controlled pain     PONV: No PONV   Sign Out status:  No Nausea or Vomiting     Neuro/Psych: Uneventful perioperative course   Sign Out Status: Preoperative baseline; Age appropriate mentation     Airway/Resp.: Uneventful perioperative course   Sign Out Status: Non labored breathing, age appropriate RR; Resp. Status within EXPECTED Parameters     CV: Uneventful perioperative course   Sign Out status: Appropriate BP and perfusion indices; Appropriate HR/Rhythm     Disposition:   Sign Out in:  PACU  Disposition:  Phase II; Home  Recovery Course: Uneventful  Follow-Up: Not required           Last Anesthesia Record Vitals:      Last PACU/Preop Vitals:  Vitals:    18 0711 18 0715 18 0720   BP: 118/87 106/76 106/76   Pulse: 62 66    Resp: 8 10 16   Temp:      SpO2: 100% 100% 100%         Electronically Signed By: Fausto Styles MD, MD, 2018, 7:28 AM

## 2018-12-22 ENCOUNTER — APPOINTMENT (OUTPATIENT)
Dept: PHYSICAL THERAPY | Facility: CLINIC | Age: 33
DRG: 482 | End: 2018-12-22
Attending: ORTHOPAEDIC SURGERY
Payer: COMMERCIAL

## 2018-12-22 VITALS
TEMPERATURE: 97 F | HEIGHT: 70 IN | OXYGEN SATURATION: 97 % | BODY MASS INDEX: 25.15 KG/M2 | HEART RATE: 84 BPM | DIASTOLIC BLOOD PRESSURE: 69 MMHG | WEIGHT: 175.71 LBS | RESPIRATION RATE: 16 BRPM | SYSTOLIC BLOOD PRESSURE: 125 MMHG

## 2018-12-22 LAB — HGB BLD-MCNC: 12.3 G/DL (ref 13.3–17.7)

## 2018-12-22 PROCEDURE — 97110 THERAPEUTIC EXERCISES: CPT | Mod: GP | Performed by: PHYSICAL THERAPIST

## 2018-12-22 PROCEDURE — 40000193 ZZH STATISTIC PT WARD VISIT: Performed by: PHYSICAL THERAPIST

## 2018-12-22 PROCEDURE — 36415 COLL VENOUS BLD VENIPUNCTURE: CPT | Performed by: STUDENT IN AN ORGANIZED HEALTH CARE EDUCATION/TRAINING PROGRAM

## 2018-12-22 PROCEDURE — 25000128 H RX IP 250 OP 636: Performed by: STUDENT IN AN ORGANIZED HEALTH CARE EDUCATION/TRAINING PROGRAM

## 2018-12-22 PROCEDURE — 97161 PT EVAL LOW COMPLEX 20 MIN: CPT | Mod: GP | Performed by: PHYSICAL THERAPIST

## 2018-12-22 PROCEDURE — 85018 HEMOGLOBIN: CPT | Performed by: STUDENT IN AN ORGANIZED HEALTH CARE EDUCATION/TRAINING PROGRAM

## 2018-12-22 PROCEDURE — 25000132 ZZH RX MED GY IP 250 OP 250 PS 637: Performed by: STUDENT IN AN ORGANIZED HEALTH CARE EDUCATION/TRAINING PROGRAM

## 2018-12-22 PROCEDURE — 97116 GAIT TRAINING THERAPY: CPT | Mod: GP | Performed by: PHYSICAL THERAPIST

## 2018-12-22 RX ORDER — OXYCODONE HYDROCHLORIDE 5 MG/1
5-10 TABLET ORAL
Qty: 80 TABLET | Refills: 0 | Status: SHIPPED | OUTPATIENT
Start: 2018-12-22 | End: 2018-12-31

## 2018-12-22 RX ORDER — ASPIRIN 325 MG
325 TABLET ORAL DAILY
Qty: 42 TABLET | Refills: 0 | Status: SHIPPED | OUTPATIENT
Start: 2018-12-22 | End: 2019-03-11

## 2018-12-22 RX ORDER — AMOXICILLIN 250 MG
2 CAPSULE ORAL 2 TIMES DAILY PRN
Qty: 60 TABLET | Refills: 1 | Status: SHIPPED | OUTPATIENT
Start: 2018-12-22 | End: 2019-03-11

## 2018-12-22 RX ORDER — ACETAMINOPHEN 325 MG/1
975 TABLET ORAL EVERY 8 HOURS PRN
Qty: 100 TABLET | Refills: 0 | Status: SHIPPED | OUTPATIENT
Start: 2018-12-22 | End: 2019-03-11

## 2018-12-22 RX ADMIN — SENNOSIDES AND DOCUSATE SODIUM 2 TABLET: 8.6; 5 TABLET ORAL at 07:52

## 2018-12-22 RX ADMIN — OXYCODONE HYDROCHLORIDE 10 MG: 5 TABLET ORAL at 08:05

## 2018-12-22 RX ADMIN — ACETAMINOPHEN 975 MG: 325 TABLET, FILM COATED ORAL at 04:44

## 2018-12-22 RX ADMIN — ASPIRIN 325 MG ORAL TABLET 325 MG: 325 PILL ORAL at 07:53

## 2018-12-22 RX ADMIN — OXYCODONE HYDROCHLORIDE 10 MG: 5 TABLET ORAL at 04:44

## 2018-12-22 RX ADMIN — OXYCODONE HYDROCHLORIDE 10 MG: 5 TABLET ORAL at 01:00

## 2018-12-22 RX ADMIN — CEFAZOLIN SODIUM 1 G: 1 INJECTION, POWDER, FOR SOLUTION INTRAMUSCULAR; INTRAVENOUS at 01:00

## 2018-12-22 ASSESSMENT — ACTIVITIES OF DAILY LIVING (ADL)
ADLS_ACUITY_SCORE: 14
ADLS_ACUITY_SCORE: 14
ADLS_ACUITY_SCORE: 12
ADLS_ACUITY_SCORE: 14

## 2018-12-22 NOTE — PLAN OF CARE
Pt seen by PT. Safe with mobility and issued exercise for the L LE to prevent atrophy. He declined need to try stairs as he has elevator at home and used crutches on stairs last year with NWB so he recalls how if he does need to go out.  Pt did request a walker for ease of mobility around the apartment.    Physical Therapy Discharge Summary    Reason for therapy discharge:    Discharged to home.  All goals and outcomes met, no further needs identified.    Progress towards therapy goal(s). See goals on Care Plan in [facility] electronic health record for goal details.  Goals met    Therapy recommendation(s):    Continue home exercise program.

## 2018-12-22 NOTE — PROGRESS NOTES
VS: stable   O2: Room air saturations 97%   Output: Up to bathroom    Last BM: 12/20/2018 reports patient   Activity: Patient able to get up to bathroom with SBA and no weightbearing to right lower leg.    Skin: Intact   Pain: Using Oxycodone for pain every 3-4 hours.   CMS: Intact   Dressing: Wound and dressing changes to pin sites were completed By Dr Velázquez this am. Dr Velázquez spent a lot of extra time teaching patient about pin care this am.   Diet: Regular/ tolerated well.   LDA:    Equipment: External Fixator   Plan: Plan is for patient to discharge to home later in shift if medically stable.    Additional Info:

## 2018-12-22 NOTE — PLAN OF CARE
Pt. admitted from PACU at 1235. Pt. accompanied by transport and arrived with personal belongings. Report was taken from EMILY Wade in PACU. Pt. is A&Ox4. CAPNO is on and activated. VSS. 02 sats= 98% on RA. Lung sounds=clear bilaterally with both anterior and posterior. Bowel sounds are active in all 4 quadrants. CMS and Neuro's are intact. Pt has intermittent numbness and tingling in RLE. Has pain in the right lower extremity and given prn Oxycodone, scheduled Tylenol and is well controlled. Pt. denied nausea, CP, SOB, lightheadedness, and dizziness. Is on a regular diet and appetite was good. RLE external fixator is intact with a small amount of drainage noted at pin sites, pin site cares to start POD 1 or 2 per orders. Pt voids spontaneously without difficulty in the bathroom. Last BM 12/20 per pt report. PIV is patent and infusing in left hand. PCD in place to LLE. Pt. educated on use and purpose of the Incentive Spirometer. Pt. is oriented to the room and call light system and the call light is within reach. Continue to monitor.

## 2018-12-22 NOTE — PLAN OF CARE
Pt is A&Ox4. VSS. LS clear, on RA. Capno. BS active, LBM on 12/20/2018. Voiding well. Pain managed with 10mg oxycodone. R external fixator dressing has dried blood, plan to change tomorrow per orders. Pt up with 1 assist, NWB on RLE. CMS intact. Continue to monitor.

## 2018-12-22 NOTE — PROGRESS NOTES
12/22/18 1450   Values Beliefs and Spiritual Care   C: Community: In support of your spiritual health, is there someone we may contact for you? (identify all that apply) ([facility]/12-22)   Visit Information   Visit Made By Staff    Type of Visit On-call;Staff consultation/triage   Visited Patient/family not available   SPIRITUAL HEALTH SERVICES  [facility] ([facility] 8A  ON-CALL VISIT     REFERRAL SOURCE: Hospital  request.     Kirit had d/rosalina by 2:30p when I arrived on unit.     PLAN: No further visits planned.       Sarah Kay  Staff   Spiritual Health Services  Pgr: [phone]    * [facility] remains available 24/7 for emergent requests/referrals, either by having the switchboard page the on-call  or by entering an ASAP/STAT consult in Epic (this will also page the on-call ).*

## 2018-12-22 NOTE — PLAN OF CARE
OT: Orders received and appreciated.  Per chart review and conversation with PT, pt does not demonstrate need for inpatient OT services.  Pt is moving well adhering to NWB RLE, has A at home for ADL.  Equipment needs in place.  PT following for safe discharge planning.  OT to defer and complete orders.  Thank you for this referral, please re-consult with change in pt status.

## 2018-12-22 NOTE — PROGRESS NOTES
Focus: Patients discharge to home  Db: Patient to be discharged to home per Orthopedics recommendations.   I: Gave patient written and oral instructions about medications, numbers to call for questions or concerns, follow up appointments, when to call Dr and PIN cares and hygiene recommendations.   E: Patient was sent home with wound dressings, PIN drain sponges and extra tuba  for over fixator. All scripts were filled here at the hospital prior to discharge. Patient went home with all other belongings. Patient to be discharged to home with significant other. Patient seemed to have a good understanding of plan of cares for discharge. Patient was escorted to front door in wheelchair to be taken home via car and family.

## 2018-12-22 NOTE — PROGRESS NOTES
"Orthopedics Daily Progress Note    S: no acute events overnight, pain controlled, tolerating diet, voiding, no numbness/tinglings, chest pain/shortness of breath, or fever/chills.     O: /69 (BP Location: Right arm)   Pulse 84   Temp 97  F (36.1  C)   Resp 16   Ht 1.778 m (5' 10\")   Wt 79.7 kg (175 lb 11.3 oz)   SpO2 97%   BMI 25.21 kg/m      Intake/Output Summary (Last 24 hours) at 12/22/2018 0933  Last data filed at 12/22/2018 0909  Gross per 24 hour   Intake 970 ml   Output 300 ml   Net 670 ml       No acute distress  Non-labored respirations  RLE: dressings with ss drainage, changed. +df/pf/ehl/fhl, sensation intact in deep peroneal, superficial peroneal, tibial, sural, saphenous nerve distributions, 2+ DP pulse.      Labs:  Recent Labs   Lab 12/21/18  0613   GLC 87     Recent Labs   Lab 12/22/18  0527 12/21/18  0613   HGB 12.3* 13.7     Imaging: post op xrays completed, no immediate complications noted       Assessment and Plan: Kirit Logan is a 33 year old male s/p placement of a Harleen spacial frame ex fix on 12/21 with Dr. Stallworth.     Ortho Primary  Activity: Up with assist until independent.  Weight bearing status: NWB RLE  Pain management: PO as tolerated   Antibiotics: Ancef x 24 hours.  Diet: Begin with clear fluids and progress diet as tolerated.   DVT prophylaxis: SCDs in house;  x 6 weeks.   Imaging: completed in PACU  Labs: Hgb POD#1.  Bracing/Splinting: None.  Dressings: Daily changes at home, discharge instructions placed.  Drains: none  Maharaj catheter: None.   Physical Therapy/Occupational Therapy: Eval and treat.  Follow-up: Clinic with Dr. Stallworth's team in 2 weeks for a wound check.  Disposition: Pending progress with therapies, pain control on orals, and medical stability, anticipate discharge to home today    Patient discussed with Dr. Federica Velázquez MD   Orthopedic Surgery; PGY-4  Pager: [phone]    For questions about this patient during weekday business " hours, please attempt to contact me at my pager prior to contacting the Orthopaedic Surgery resident on call. On the weekends and overnight, please page the Orthopaedic Surgery resident on call. Thank you!

## 2018-12-22 NOTE — DISCHARGE SUMMARY
ORTHOPAEDIC SURGERY DISCHARGE SUMMARY     Date of Admission: 12/21/2018  Date of Discharge: 12/22/2018  1:50 PM  Disposition: Home  Staff Physician: Shaheed Stallworth MD  Primary Care Provider: [facility], [facility]    DISCHARGE DIAGNOSIS:  Excessive Genu Valgus, Status Post Osteotomy, Valgus Deformity Right Knee     PROCEDURES: Procedure(s):  Right Tibial Plate Removal  RIGHT HIGH TIBIAL OSTEOTOMY,RIGHT GERRY SPATIAL FRAME  APPLY EXTERNAL FIXATOR ILIZAROV on 12/21/2018    BRIEF HISTORY:  Kirit had previously undergone a lateral closing wedge high tibial osteotomy.  He presented with 15 degrees of valgus and severe lateral compartment symptoms.  The plan was for gradual correction of his alignment to 3 degrees valgus of the mechanical axis, to remove some of the load being placed on his lateral side but not overload his already degenerative medial compartment.        HOSPITAL COURSE:    The patient was admitted following the above listed procedures for pain control and rehabilitation. Kirit Logan did well post-operatively. The patient received routine nursing cares and at the time of discharge was medically stable. Vital signs were stable throughout admission. The patient is tolerating a regular diet and is voiding spontaneously. All PT/OT goals have been met for safe mobility. Pain is now controlled on oral medications which will be available on discharge. Stool softeners have been used while taking pain medications to help prevent constipation. Kirit Logan is deemed medically safe to discharge.     Antibiotics:  Ancef given periop and 24 hours postop.   DVT prophylaxis:  ASA initiated after surgery and will be continued for 6 weeks.   PT Progress:  Has met PT/OT goals for safe mobility.    Pain Meds:  Weaned off all IV pain meds by discharge.  Inpatient Events: No significant events or complications.   Follow up:   Follow up with Dr. Stallworth's team at 2 weeks post operatively    Future Appointments   Date Time  Provider Department [place]   12/22/2018 10:30 AM Francine Carter, PT UR[facility] [place]   12/22/2018  1:00 PM Katiana Jefferson OT UROTRISH [place]   12/22/2018  2:00 PM Francine Carter, PT [facility] [place]   1/3/2019 11:00 AM Sheree Tapia APRN CNP [facility]   2/4/2019 11:30 AM Shaheed Stallworth MD [facility]       Orthopaedic Surgery appointments are at the [facility] ([address] [place] [address]). Call [phone] to schedule a follow-up appointment at this location with your provider.     PHYSICAL EXAM:    Please see progress note from the day of discharge.   Pertinent findings include: none    Pending studies:   None      PLANNED DISCHARGE ORDERS:      Discharge Medication List as of 12/22/2018 12:03 PM      START taking these medications    Details   acetaminophen (TYLENOL) 325 MG tablet Take 3 tablets (975 mg) by mouth every 8 hours as needed for fever or pain, Disp-100 tablet, R-0, E-Prescribe      aspirin (ASA) 325 MG tablet Take 1 tablet (325 mg) by mouth daily, Disp-42 tablet, R-0, E-Prescribe      order for DME Equipment being ordered: Walker Wheels () and Walker ()  Treatment Diagnosis: Gait instablityDisp-1 each, R-0, Local Print      oxyCODONE (ROXICODONE) 5 MG tablet Take 1-2 tablets (5-10 mg) by mouth every 3 hours as needed for moderate to severe pain, Disp-80 tablet, R-0, Local Print      senna-docusate (SENOKOT-S/PERICOLACE) 8.6-50 MG tablet Take 2 tablets by mouth 2 times daily as needed for constipation (no stool in 24 hrs), Disp-60 tablet, R-1, E-Prescribe               Discharge Procedure Orders   Reason for your hospital stay   Order Comments: Right lower extremity re-alignment surgery.     Adult [facility] Follow-up and recommended labs and tests   Order Comments: Follow up as previously scheduled on 1/3 and then with Dr. Stallworth on 2/4     Appointments on [place] and/or [facility] (with [facility] or [facility] provider or  service). Call [phone] if you haven't heard regarding these appointments within 7 days of discharge.     Activity   Order Comments: Your activity upon discharge: as tolerated, but no weightbearing on the right lower extremity     Order Specific Question Answer Comments   Is discharge order? Yes      Discharge Instructions   Order Comments: -Pain control: Take medication as prescribed, but only as often as necessary. Do not drive or drink alcohol while you are taking pain medication. Remember that Tylenol can be used for pain relief as you wean off the narcotics. Do not exceed 4,000 mg of tylenol in 24 hours.    -Activity: Non weightbearing right lower extremity    -DVT prophylaxis:  mg daily for 6 weeks or as instructed by your surgeon.    -Call our clinic at [phone] during regular office hours, or [phone] for the nurse triage line after hours for questions - NURSES AND ON-CALL RESIDENTS DO NOT HAVE ABILITY TO PRESCRIBE NARCOTICS AFTER HOURS OR ON WEEKENDS. If narcotic pain medication prescriptions are required during these times, you will have to go to an Urgent Care or Emergency Department, or wait until clinic opens.    -Reasons to call clinic: Pain in your surgical area persists or worsens in the first few days after discharge; excessive redness or drainage of cloudy or bloody material from the wounds (some light drainage is often normal in the first week after surgery); drainage of any kind greater than one week after surgery; temperature elevation greater than 101.0 ; You have pain, swelling or redness in your calf that does not improve with elevation; new numbness or weakness in your leg or foot which does not improve with repositioning.  Call your primary doctor or seek medical care if you have chest pain or shortness of breath.     Wound care and dressings   Order Comments: Change dressings as needed.     Wound care and dressings   Order Comments: Pins and wires are necessary  "components of limb lengthening, deformity correction and external fixation. The purpose of pin/wire site care is to keep the interface of the pin (or wire) and skin free from bacteria and to prevent trauma to the skin. Keeping the skin clean helps prevent infection. Pin/wire site care is done weekly and increased if needed. Pin/wire site care should follow any showering, bathing or swimming (do not shower, bath or swim until your sutures are removed and you have been seen for your postoperative visit).     Pin/wire site care will be done in the hospital on the first or second postoperative day. You will review site care at this time and will be given supplies for home.  1. Wash hands thoroughly with Dial (or other antibacterial soap) for at least one minute.  2. Push up the red/white plastic clips on the pin/wire if you have them on your external fixator.  3. Remove foam sponges and gauze dressings. You may soak the difficult, encrusted ones with normal saline or run clear water over the site until they loosen. Slowly \"teasing\" the sponge off causes less pain, anxiety, and bleeding.  4. Inspect all sites for redness, tenderness and drainage. If the sites are clear, normal saline can be used for cleaning. If signs of infection are present, half strength hydrogen peroxide should be used.  5. Put several sterile cotton-tipped applicators in normal saline and allow them to soak for several seconds.  6. Take one applicator and use a rolling motion to apply gentle pressure with cotton tip at junction of skin and pin/wire site. Do not use excessive force, but remove the scab if able.  7. Once the site is cleaned, you may clean the side of the pin or wire if needed. Do NOT use the same applicator on the skin that you use to clean the pin and/or wire.  8. Use a new applicator at each site.  9. Cover the site with a split 2x2 gauze, followed by foam sponge and plastic clip of felt pad over gauze depending on which fixator you " "have. You may reuse the sponges by cleaning them with soap and water, then boil them for one minute and allow to air dry.  10. Compress the clip, sponge and gauze gently. We recommend using the foam and gauze dressings during the adjustment phase, but consider it optional after that time. Some people prefer the sites covered while others find it a nuisance. If a pin or  wire site is infected, we encourage the use of dressings until that site is clear of infection.    IF THERE ARE SIGNS OF REDNESS OR DRAINAGE INCREASE PIN/WIRE SITE CARE AT THAT SITE WITH HALF STRENGTH HYDROGEN PEROXIDE. FOLLOW THE \"PIN/WIRE SITE CLASSIFICATION AND TREATMENT\" AS OUTLINED IN THE NEXT SECTION.    Pin/Wire Site Classification and Treatment  Pin/wire site infections are common with the use of external fixators. Nearly all patients will experience several pin/wire site infections throughout their course, with the severity of infection determining the treatment. The following guidelines outline the grading and appearance of sites and the appropriate action to take. Communication with the clinic is extremely important if you have questions regarding the signs of infection.    Grade  Appearance     Treatment  0  Clear of redness/drainage   Clean weekly  1  Slight redness/inflammation   Clean daily with saline  2 Red/tenderness with drainage  Clean daily with H2o2, Start bactroban   3  Painful,red, thicker drainage   Start anbitotics, Follow Grade 2 plan  4  Severe discomfort/fever   Antibiotics, Follow Grade 3, + pin removal  5  All of the above + bone infection  Surgery/debridement/exchange     Diet   Order Comments: Follow this diet upon discharge: regular     Order Specific Question Answer Comments   Is discharge order? Yes          Maikel Velázquez MD  Orthopedic Surgery, PGY-4  Pager: [phone]    "

## 2018-12-23 ENCOUNTER — PATIENT OUTREACH (OUTPATIENT)
Dept: CARE COORDINATION | Facility: CLINIC | Age: 33
End: 2018-12-23

## 2018-12-24 NOTE — PROGRESS NOTES
[facility]: Post-Discharge Note  SITUATION                                                      Admission:    Admission Date: 12/21/18   Reason for Admission: Excessive Genu Valgus, Status Post Osteotomy, Valgus Deformity Right Knee   Discharge:   Discharge Date: 12/22/18  Discharge Diagnosis: Excessive Genu Valgus, Status Post Osteotomy, Valgus Deformity Right Knee   Discharge Service: Orthopedics    BACKGROUND                                                      Kirit had previously undergone a lateral closing wedge high tibial osteotomy.  He presented with 15 degrees of valgus and severe lateral compartment symptoms.  The plan was for gradual correction of his alignment to 3 degrees valgus of the mechanical axis, to remove some of the load being placed on his lateral side but not overload his already degenerative medial compartment.    ASSESSMENT      Discharge Assessment  Patient reports symptoms are: Improved  Does the patient have all of their medications?: Yes  Does patient know what their new medications are for?: Yes  Does patient have a follow-up appointment scheduled?: Yes  Does patient have any other questions or concerns?: No    Post-op  Did the patient have surgery or a procedure: Yes  Fever: No  Chills: No  Redness: No  Warmth: No  Swelling: No  Eating & Drinking: eating and drinking without complaints/concerns  Urinary Status: voiding without complaint/concerns    PLAN                                                      Outpatient Plan:      Follow up with Dr. Stallworth's team at 2 weeks post operatively    Future Appointments   Date Time Provider Department Center   1/3/2019 11:00 AM Sheree Tapia APRN [facility]   2/4/2019 11:30 AM Shaheed Stallworth MD [facility]           Elise Mckeon, [facility]

## 2018-12-31 ENCOUNTER — TELEPHONE (OUTPATIENT)
Dept: ORTHOPEDICS | Facility: CLINIC | Age: 33
End: 2018-12-31

## 2018-12-31 DIAGNOSIS — M21.969 TIBIAL DEFORMITY, ACQUIRED: ICD-10-CM

## 2018-12-31 RX ORDER — OXYCODONE HYDROCHLORIDE 5 MG/1
5-10 TABLET ORAL EVERY 4 HOURS PRN
Qty: 60 TABLET | Refills: 0 | Status: SHIPPED | OUTPATIENT
Start: 2018-12-31 | End: 2019-01-14

## 2018-12-31 NOTE — TELEPHONE ENCOUNTER
Call patient back regarding oxycodone refill request.  He is status post surgery with Dr. Stallworth.  He reports taking 1-2 oxycodone every 3-4 hours.  This refill will be done today, signed by Dr. Dowd in clinic and dropped of at the [address] [place] [address] pharmacy for patient to  today.     Patient also reported evening dizziness and lightheadedness.  Did not complain of any other symptoms.  Denies fever, chills, drainage or redness at incision.  I encouraged him to stay hydrated and to take his time moving from a sitting to standing position.      He was encouraged to call our office if he has any other symptoms or concerns.

## 2018-12-31 NOTE — TELEPHONE ENCOUNTER
Pharmacy called to say that the patients insurance will only allow the patient to receive 56 tablets instead of the 60 that was originally written. Simin Loera okayed the change from 60 to 56.     Michelle Siddiqui ATC

## 2019-01-03 ENCOUNTER — ANCILLARY PROCEDURE (OUTPATIENT)
Dept: GENERAL RADIOLOGY | Facility: CLINIC | Age: 34
End: 2019-01-03
Payer: COMMERCIAL

## 2019-01-03 ENCOUNTER — OFFICE VISIT (OUTPATIENT)
Dept: ORTHOPEDICS | Facility: CLINIC | Age: 34
End: 2019-01-03
Payer: COMMERCIAL

## 2019-01-03 DIAGNOSIS — Z98.890 STATUS POST OSTEOTOMY: Primary | ICD-10-CM

## 2019-01-03 DIAGNOSIS — M21.969 TIBIAL DEFORMITY, ACQUIRED: ICD-10-CM

## 2019-01-03 DIAGNOSIS — M21.969 TIBIAL DEFORMITY, ACQUIRED: Primary | ICD-10-CM

## 2019-01-03 RX ORDER — LIDOCAINE/PRILOCAINE 2.5 %-2.5%
CREAM (GRAM) TOPICAL PRN
Qty: 2.5 G | Refills: 3 | Status: SHIPPED | OUTPATIENT
Start: 2019-01-03 | End: 2019-03-11

## 2019-01-03 NOTE — PROGRESS NOTES
"Kirit is seen 2 weeks post op of his spatial frame application revision osteotomy s/p valgus deformity. DOS: 12/21/2018. He is doing \"just ok\". Denies fevers, chills, or SOB. Taking an aspirin daily. Calf nontender to palpate with a negative Homans. Pin and wire sites are all healing/intact without drainage. Incison is healing. Alignment is improving. His TSF schedule is seen and reviewed and he is maintaining the appropriate amount of adjustments spacing them 4 hours apart.    Xrays taken show improvement in his valgus deformity with healing noted through osteotomy site.    Dx:  1. Revision valgus deformity osteotomy with TSF    Plan:  1. Will follow up in 1 week for a full length and 2v left knee xray on arrival.  "

## 2019-01-03 NOTE — LETTER
"1/3/2019       RE: Kirit Logan  [address]  [place] [address]     Dear Colleague,    Thank you for referring your patient, Kirit Logan, to the [facility] at [facility]. Please see a copy of my visit note below.    Kirit is seen 2 weeks post op of his spatial frame application revision osteotomy s/p valgus deformity. DOS: 12/21/2018. He is doing \"just ok\". Denies fevers, chills, or SOB. Taking an aspirin daily. Calf nontender to palpate with a negative Homans. Pin and wire sites are all healing/intact without drainage. Incison is healing. Alignment is improving. His TSF schedule is seen and reviewed and he is maintaining the appropriate amount of adjustments spacing them 4 hours apart.    Xrays taken show improvement in his valgus deformity with healing noted through osteotomy site.    Dx:  1. Revision valgus deformity osteotomy with TSF    Plan:  1. Will follow up in 1 week for a full length and 2v left knee xray on arrival.    Again, thank you for allowing me to participate in the care of your patient.      Sincerely,    Sheree Tapia, ARAM CNP      "

## 2019-01-10 ENCOUNTER — OFFICE VISIT (OUTPATIENT)
Dept: ORTHOPEDICS | Facility: CLINIC | Age: 34
End: 2019-01-10
Payer: COMMERCIAL

## 2019-01-10 ENCOUNTER — TELEPHONE (OUTPATIENT)
Dept: ORTHOPEDICS | Facility: CLINIC | Age: 34
End: 2019-01-10

## 2019-01-10 ENCOUNTER — ANCILLARY PROCEDURE (OUTPATIENT)
Dept: GENERAL RADIOLOGY | Facility: CLINIC | Age: 34
End: 2019-01-10
Payer: COMMERCIAL

## 2019-01-10 DIAGNOSIS — M21.969 TIBIAL DEFORMITY, ACQUIRED: Primary | ICD-10-CM

## 2019-01-10 DIAGNOSIS — M21.969 TIBIAL DEFORMITY, ACQUIRED: ICD-10-CM

## 2019-01-10 RX ORDER — OXYCODONE HYDROCHLORIDE 5 MG/1
5 TABLET ORAL EVERY 6 HOURS PRN
Qty: 30 TABLET | Refills: 0 | Status: SHIPPED | OUTPATIENT
Start: 2019-01-10 | End: 2019-03-11

## 2019-01-10 ASSESSMENT — ENCOUNTER SYMPTOMS
MUSCLE CRAMPS: 0
FEVER: 1
NAUSEA: 1
BLOATING: 0
WEAKNESS: 1
SEIZURES: 0
JAUNDICE: 0
SPEECH CHANGE: 0
POLYPHAGIA: 1
PARALYSIS: 0
ORTHOPNEA: 0
LOSS OF CONSCIOUSNESS: 0
MYALGIAS: 0
POLYDIPSIA: 0
CHILLS: 1
RECTAL PAIN: 0
PALPITATIONS: 0
DISTURBANCES IN COORDINATION: 0
HYPERTENSION: 0
BOWEL INCONTINENCE: 0
HEADACHES: 1
JOINT SWELLING: 1
NUMBNESS: 0
EXERCISE INTOLERANCE: 0
SYNCOPE: 0
ARTHRALGIAS: 1
FATIGUE: 1
MUSCLE WEAKNESS: 1
WEIGHT LOSS: 0
TREMORS: 0
HYPOTENSION: 0
INCREASED ENERGY: 1
ABDOMINAL PAIN: 0
DIARRHEA: 0
LIGHT-HEADEDNESS: 1
ALTERED TEMPERATURE REGULATION: 1
MEMORY LOSS: 0
VOMITING: 0
DECREASED APPETITE: 1
DIZZINESS: 1
HEARTBURN: 0
TINGLING: 1
SLEEP DISTURBANCES DUE TO BREATHING: 0
STIFFNESS: 0
BLOOD IN STOOL: 0
BACK PAIN: 0
WEIGHT GAIN: 0
NECK PAIN: 0

## 2019-01-10 NOTE — PROGRESS NOTES
"Kirit is seen 3 weeks post op of his spatial frame application revision osteotomy s/p valgus deformity.  DOS: 12/21/2018. He states he is doing well and taking oxycodone \"2-3 times a day but mostly needs it at night\". Has been doing his corrections and today he still has 4 left. His prescription and struts were checked and are accurate. He had some calf pain yesterday and presented to his clinic because of his history of a blood clot and US was negative per patient report. Minimal swelling noted and no tenderness today to palpate. Pin and wire sites are all healing uneventfully without inflammation or infection or drainage. Alignment is near neutral. Denies chest pain or SOB. Afebrile.     Xrays including a full length that shows marked improvement in alignment that is measuring near neutral with slight valgus and a lateral view that shows anterior translation of the tibia.    Dx:  1. Revision right proximal tibial osteotomy with TSF    Plan:  1. Will finish his residual correction today.  2. Rest and elevate over the weekend.  3. Will follow up Monday where we do a slight adjustment to his fixator with xrays to follow office visit.   4. Refilled oxycodone. Continue aspirin until correction is complete.  "

## 2019-01-10 NOTE — LETTER
"1/10/2019       RE: Kirit Logan  [address]  [place] [address]     Dear Colleague,    Thank you for referring your patient, Kirit Logan, to the [facility] at [facility]. Please see a copy of my visit note below.    Kirit is seen 3 weeks post op of his spatial frame application revision osteotomy s/p valgus deformity.  DOS: 12/21/2018. He states he is doing well and taking oxycodone \"2-3 times a day but mostly needs it at night\". Has been doing his corrections and today he still has 4 left. His prescription and struts were checked and are accurate. He had some calf pain yesterday and presented to his clinic because of his history of a blood clot and US was negative per patient report. Minimal swelling noted and no tenderness today to palpate. Pin and wire sites are all healing uneventfully without inflammation or infection or drainage. Alignment is near neutral. Denies chest pain or SOB. Afebrile.     Xrays including a full length that shows marked improvement in alignment that is measuring near neutral with slight valgus and a lateral view that shows anterior translation of the tibia.    Dx:  1. Revision right proximal tibial osteotomy with TSF    Plan:  1. Will finish his residual correction today.  2. Rest and elevate over the weekend.  3. Will follow up Monday where we do a slight adjustment to his fixator with xrays to follow office visit.   4. Refilled oxycodone. Continue aspirin until correction is complete.    Again, thank you for allowing me to participate in the care of your patient.      Sincerely,    Sheree Tapia, ARAM CNP      "

## 2019-01-10 NOTE — TELEPHONE ENCOUNTER
[facility] Prior Authorization Team Request    Medication: Oxycodone 5mg  Dosin every 6 hours  Qty: 30  Day Supply: 7  ND (required for Medicaid members): [address]-81     Insurance   BIN: [id number]  PCN: [id number]  Grp: [id number]  ID: [id number]    CoverMyMeds Key (if applicable):     Additional documentation: Prior auth needed for getting more than a 14 day supply of narcotic medications (total, between all Rx's) within a 60 day period.      Filling Pharmacy: [facility]  Phone Number: [phone]  Contact:    Pharmacy NPI (required for Medicaid members): [id number]

## 2019-01-14 ENCOUNTER — ANCILLARY PROCEDURE (OUTPATIENT)
Dept: GENERAL RADIOLOGY | Facility: CLINIC | Age: 34
End: 2019-01-14
Payer: COMMERCIAL

## 2019-01-14 ENCOUNTER — OFFICE VISIT (OUTPATIENT)
Dept: ORTHOPEDICS | Facility: CLINIC | Age: 34
End: 2019-01-14
Payer: COMMERCIAL

## 2019-01-14 DIAGNOSIS — M21.969 TIBIAL DEFORMITY, ACQUIRED: ICD-10-CM

## 2019-01-14 DIAGNOSIS — M21.061 KNOCK KNEE OF RIGHT LOWER EXTREMITY: ICD-10-CM

## 2019-01-14 DIAGNOSIS — M21.969 TIBIAL DEFORMITY, ACQUIRED: Primary | ICD-10-CM

## 2019-01-14 RX ORDER — OXYCODONE HYDROCHLORIDE 5 MG/1
5-10 TABLET ORAL EVERY 4 HOURS PRN
Qty: 50 TABLET | Refills: 0 | Status: SHIPPED | OUTPATIENT
Start: 2019-01-14 | End: 2019-01-24

## 2019-01-14 ASSESSMENT — ENCOUNTER SYMPTOMS
JOINT SWELLING: 1
MYALGIAS: 1
ARTHRALGIAS: 1
MUSCLE CRAMPS: 0
BACK PAIN: 0
MUSCLE WEAKNESS: 1
NECK PAIN: 0
STIFFNESS: 0

## 2019-01-14 NOTE — PROGRESS NOTES
HISTORY  Kirit returns after completing his assigned program for R Tibial Correction with TSF    His pain has been manageable    REVIEW OF SYSTEMS / PAST HISTORY  As prev      EXAMINATION  The patient presents alert and oriented with normal affect.  Circulatory status of the limb is normal. Neurological examination of the affected limb reveals no abnormalities.     Pin sites satisfactory      IMAGING   Radiographs demonstrate Anterior translation of the distal fragment and overall ideal coronal alignment (3* valgus)    I have performed a series of manual adjustments for the anterior translation and it is now well aligned on repeat imaging    After this, we compressed sequentially the rings by 2 turns each    Kirit tolerated this well      ASSESSMENT  Healing phase of gradual tibial correction  1. Can begin to WB on the limb  2. We will review in 10 days time to compress another 1 mm through all struts.         The patient has been seen and examined by Dr MARCO Stallworth who is in agreement with the above plan.     - Dr. Brian Huang (Orthopaedic Fellow)   Answers for HPI/ROS submitted by the patient on 1/14/2019   General Symptoms: No  Skin Symptoms: No  HENT Symptoms: No  EYE SYMPTOMS: No  HEART SYMPTOMS: No  LUNG SYMPTOMS: No  INTESTINAL SYMPTOMS: No  URINARY SYMPTOMS: No  REPRODUCTIVE SYMPTOMS: No  SKELETAL SYMPTOMS: Yes  BLOOD SYMPTOMS: No  NERVOUS SYSTEM SYMPTOMS: No  MENTAL HEALTH SYMPTOMS: No  Back pain: No  Muscle aches: Yes  Neck pain: No  Swollen joints: Yes  Joint pain: Yes  Bone pain: Yes  Muscle cramps: No  Muscle weakness: Yes  Joint stiffness: No  Bone fracture: No

## 2019-01-14 NOTE — LETTER
1/14/2019       RE: Kirit Logan  [address]  [place] [facility] [place] [address]     Dear Colleague,    Thank you for referring your patient, Kirit Logan, to the [facility] at [facility]. Please see a copy of my visit note below.      HISTORY  Kirit returns after completing his assigned program for R Tibial Correction with TSF    His pain has been manageable    REVIEW OF SYSTEMS / PAST HISTORY  As prev      EXAMINATION  The patient presents alert and oriented with normal affect.  Circulatory status of the limb is normal. Neurological examination of the affected limb reveals no abnormalities.     Pin sites satisfactory      IMAGING   Radiographs demonstrate Anterior translation of the distal fragment and overall ideal coronal alignment (3* valgus)    I have performed a series of manual adjustments for the anterior translation and it is now well aligned on repeat imaging    After this, we compressed sequentially the rings by 2 turns each    Kirit tolerated this well      ASSESSMENT  Healing phase of gradual tibial correction  1. Can begin to WB on the limb  2. We will review in 10 days time to compress another 1 mm through all struts.         The patient has been seen and examined by Dr MARCO Stallworth who is in agreement with the above plan.     - Dr. Brian Huang (Orthopaedic Fellow)     Again, thank you for allowing me to participate in the care of your patient.      Sincerely,    Shaheed Stallworth MD

## 2019-01-14 NOTE — TELEPHONE ENCOUNTER
Central Prior Authorization Team   Phone: [phone]      PA Initiation    Medication: Oxycodone 5mg-PA initiated  Insurance Company: [employer] - Phone [phone] Fax [phone]  Pharmacy Filling the Rx: [facility] [address] [place] [id number] [facility]  Filling Pharmacy Phone: [phone]  Filling Pharmacy Fax:    Start Date: 1/14/2019

## 2019-01-14 NOTE — TELEPHONE ENCOUNTER
Prior Authorization Approval    Authorization Effective Date: 1/14/2019  Authorization Expiration Date: 2/14/2019  Medication: Oxycodone 5mg-PA approved-30 days only  Approved Dose/Quantity:   Reference #:     Insurance Company: [employer] - Phone [phone] Fax [phone]  Expected CoPay:       CoPay Card Available:      Foundation Assistance Needed:    Which Pharmacy is filling the prescription (Not needed for infusion/clinic administered): [facility] [address] [place] [id number] [facility]  Pharmacy Notified: Yes  Patient Notified: No

## 2019-01-17 DIAGNOSIS — M21.969 TIBIAL DEFORMITY, ACQUIRED: Primary | ICD-10-CM

## 2019-01-24 ENCOUNTER — ANCILLARY PROCEDURE (OUTPATIENT)
Dept: GENERAL RADIOLOGY | Facility: CLINIC | Age: 34
End: 2019-01-24
Payer: COMMERCIAL

## 2019-01-24 ENCOUNTER — OFFICE VISIT (OUTPATIENT)
Dept: ORTHOPEDICS | Facility: CLINIC | Age: 34
End: 2019-01-24
Payer: COMMERCIAL

## 2019-01-24 DIAGNOSIS — M21.969 TIBIAL DEFORMITY, ACQUIRED: ICD-10-CM

## 2019-01-24 DIAGNOSIS — M21.70 LEG LENGTH DIFFERENCE, ACQUIRED: Primary | ICD-10-CM

## 2019-01-24 RX ORDER — OXYCODONE HYDROCHLORIDE 5 MG/1
5-10 TABLET ORAL EVERY 4 HOURS PRN
Qty: 30 TABLET | Refills: 0 | Status: SHIPPED | OUTPATIENT
Start: 2019-01-24 | End: 2019-03-11

## 2019-01-24 NOTE — LETTER
1/24/2019       RE: Kirit Logan  [address]  [place] [address]     Dear Colleague,    Thank you for referring your patient, Kirit Logan, to the [facility] at [facility]. Please see a copy of my visit note below.    Kirit is seen today for a fixator adjustment and repeat xrays. 1mm compression was done equally throughout his fixator on all the struts. He tolerated it well.    Full length and lateral tibia xray taken show ideal position of lower extremity with early evidence of healing noted.    He will follow up in 10 days for a full length and right tibia lateral on arrival.     Again, thank you for allowing me to participate in the care of your patient.      Sincerely,    ARAM Parker CNP

## 2019-01-24 NOTE — PROGRESS NOTES
Kirit is seen today for a fixator adjustment and repeat xrays. 1mm compression was done equally throughout his fixator on all the struts. He tolerated it well.    Full length and lateral tibia xray taken show ideal position of lower extremity with early evidence of healing noted.    He will follow up in 10 days for a full length and right tibia lateral on arrival.

## 2019-01-29 ENCOUNTER — TELEPHONE (OUTPATIENT)
Dept: ORTHOPEDICS | Facility: CLINIC | Age: 34
End: 2019-01-29

## 2019-01-29 DIAGNOSIS — T84.7XXA: Primary | ICD-10-CM

## 2019-01-29 RX ORDER — CEPHALEXIN 500 MG/1
500 CAPSULE ORAL 3 TIMES DAILY
Qty: 30 CAPSULE | Refills: 0 | Status: SHIPPED | OUTPATIENT
Start: 2019-01-29 | End: 2019-03-11

## 2019-01-29 NOTE — TELEPHONE ENCOUNTER
Called pt to F/U on a staff message from 's Admin saying he had concerns on his incisions being swollen and having  leakage. Pt is S/P right tibial plate removal and external fixation. He states that his pain level is 10/10. It was getting better but today is worse. The incision is warm and swollen and the skin is red. Pt denies having a fever, He states that he has been taking oxycodone, elevating, and icing limb. He also has concerns that the wound is leaking, he says it leaks when walking. I advised the pt to come in to see Sheree Tapia NP on Thursday 1/31/19. He chose to schedule an apt at 2:00 pm. I informed  team of pt condition.

## 2019-01-31 ENCOUNTER — OFFICE VISIT (OUTPATIENT)
Dept: ORTHOPEDICS | Facility: CLINIC | Age: 34
End: 2019-01-31
Payer: COMMERCIAL

## 2019-01-31 ENCOUNTER — ANCILLARY PROCEDURE (OUTPATIENT)
Dept: GENERAL RADIOLOGY | Facility: CLINIC | Age: 34
End: 2019-01-31
Payer: COMMERCIAL

## 2019-01-31 DIAGNOSIS — M21.969 TIBIAL DEFORMITY, ACQUIRED: ICD-10-CM

## 2019-01-31 DIAGNOSIS — L08.9 SKIN INFECTION: Primary | ICD-10-CM

## 2019-01-31 DIAGNOSIS — M21.969 TIBIAL DEFORMITY, ACQUIRED: Primary | ICD-10-CM

## 2019-01-31 DIAGNOSIS — Z98.890 STATUS POST OSTEOTOMY: ICD-10-CM

## 2019-01-31 RX ORDER — OXYCODONE HYDROCHLORIDE 5 MG/1
5 TABLET ORAL EVERY 8 HOURS PRN
Qty: 30 TABLET | Refills: 0 | Status: SHIPPED | OUTPATIENT
Start: 2019-01-31 | End: 2019-03-11

## 2019-01-31 RX ORDER — CEPHALEXIN 500 MG/1
500 CAPSULE ORAL 2 TIMES DAILY
Qty: 20 CAPSULE | Refills: 0 | Status: SHIPPED | OUTPATIENT
Start: 2019-01-31 | End: 2019-03-11

## 2019-01-31 NOTE — LETTER
"1/31/2019       RE: Kirit Logan  [address]  [facility] [address]     Dear Colleague,    Thank you for referring your patient, Kirit Logan, to the [facility] at [facility]. Please see a copy of my visit note below.    Kirit is seen as an add on with complaints \"of increased drainage through the wire\". His leg is seen and examined and has minimal swelling with focal areas of tenderness. He has started keflex per Dr. Resendiz recommendation. Half pins are intact without inflammatory reactions. The medial/proximal wires to the knee joint have grade II inflammatory changes surrounding the skin with serrous fluid upon palpation. No purulence. Minimal tenderness to palpate. No pain to the osteotomy site itself.    Xrays taken show marked improvement in his alignment that is near neutral compared to valgus of +10 preop. There is slight healing noted through the osteotomy site.     Dx:  1. Grade III inflammatory changes proximal/medial wires  2. Revision osteotomy with TSF    Plan:  1. Dr. Huang did some further compressions to the 3 lateral struts today. He tolerated it well.  2. Went through pin/wire care now using H2O2.  3. Finish keflex.  4. Follow up Monday for a repeat full length and right tibia lateral on arrival.     Again, thank you for allowing me to participate in the care of your patient.      Sincerely,    Sheree Tapia, ARAM CNP      "

## 2019-01-31 NOTE — PROGRESS NOTES
"Kirit is seen as an add on with complaints \"of increased drainage through the wire\". His leg is seen and examined and has minimal swelling with focal areas of tenderness. He has started keflex per Dr. Resendiz recommendation. Half pins are intact without inflammatory reactions. The medial/proximal wires to the knee joint have grade II inflammatory changes surrounding the skin with serrous fluid upon palpation. No purulence. Minimal tenderness to palpate. No pain to the osteotomy site itself.    Xrays taken show marked improvement in his alignment that is near neutral compared to valgus of +10 preop. There is slight healing noted through the osteotomy site.     Dx:  1. Grade III inflammatory changes proximal/medial wires  2. Revision osteotomy with TSF    Plan:  1. Dr. Huang did some further compressions to the 3 lateral struts today. He tolerated it well.  2. Went through pin/wire care now using H2O2.  3. Finish keflex.  4. Follow up Monday for a repeat full length and right tibia lateral on arrival.   "

## 2019-02-04 ENCOUNTER — ANCILLARY PROCEDURE (OUTPATIENT)
Dept: GENERAL RADIOLOGY | Facility: CLINIC | Age: 34
End: 2019-02-04
Payer: COMMERCIAL

## 2019-02-04 ENCOUNTER — OFFICE VISIT (OUTPATIENT)
Dept: ORTHOPEDICS | Facility: CLINIC | Age: 34
End: 2019-02-04
Payer: COMMERCIAL

## 2019-02-04 DIAGNOSIS — M21.969 TIBIAL DEFORMITY, ACQUIRED: ICD-10-CM

## 2019-02-04 DIAGNOSIS — M21.969 TIBIAL DEFORMITY, ACQUIRED: Primary | ICD-10-CM

## 2019-02-04 ASSESSMENT — ENCOUNTER SYMPTOMS
JOINT SWELLING: 1
MUSCLE CRAMPS: 0
BACK PAIN: 0
MYALGIAS: 0
ARTHRALGIAS: 1
MUSCLE WEAKNESS: 0
STIFFNESS: 0

## 2019-02-04 NOTE — PROGRESS NOTES
HISTORY  Kirit returns today 6 weeks post application of Harleen spatial frame to his right tibia for correction of a high tibial osteotomy.  His recent history is notable for a grade 3 pin site infection which he brought to our attention last Tuesday, on Wednesday of last week commenced a course of oral antibiotics and had the wounds were redressed on Thursday.  Kirit reports that the pain is settled down, and there is much less oozing from the pin sites.        REVIEW OF SYSTEMS / PAST HISTORY  As previous      EXAMINATION  The patient presents alert and oriented with normal affect.  Circulatory status of the limb is normal. Neurological examination of the affected limb reveals no abnormalities.     Correct alignment clinically is satisfactory.  The pin sites certainly has settled down, and all of the pin sites satisfactory.    IMAGING   Radiographs demonstrate mechanical axis of that intersects the middle of his tibial spine.  We shortened the lateral struts, to take the weightbearing line slightly more laterally and repeated the radiographs today.  Subsequent to this we performed another 2 mm of shortening on the 2 lateral struts.    ASSESSMENT  Kirit is progressing well.  He can continue to bear some weight through the limb.  We will see him in 3 weeks time with updated imaging.        - Dr. Brian Huang (Orthopaedic Fellow)     Answers for HPI/ROS submitted by the patient on 2/4/2019   General Symptoms: No  Skin Symptoms: No  HENT Symptoms: No  EYE SYMPTOMS: No  HEART SYMPTOMS: No  LUNG SYMPTOMS: No  INTESTINAL SYMPTOMS: No  URINARY SYMPTOMS: No  REPRODUCTIVE SYMPTOMS: No  SKELETAL SYMPTOMS: Yes  BLOOD SYMPTOMS: No  NERVOUS SYSTEM SYMPTOMS: No  MENTAL HEALTH SYMPTOMS: No  Back pain: No  Muscle aches: No  Swollen joints: Yes  Joint pain: Yes  Bone pain: No  Muscle cramps: No  Muscle weakness: No  Joint stiffness: No  Bone fracture: No    I agree with the fellow physician and the plan outlined, and saw the  patient.  Shaheed Stallworth

## 2019-02-04 NOTE — LETTER
2/4/2019       RE: Kirit Logan  [address]  [facility] [address]     Dear Colleague,    Thank you for referring your patient, Kirit Logan, to the [facility] at [facility]. Please see a copy of my visit note below.      HISTORY  Kirit returns today 6 weeks post application of Harleen spatial frame to his right tibia for correction of a high tibial osteotomy.  His recent history is notable for a grade 3 pin site infection which he brought to our attention last Tuesday, on Wednesday of last week commenced a course of oral antibiotics and had the wounds were redressed on Thursday.  Kirit reports that the pain is settled down, and there is much less oozing from the pin sites.        REVIEW OF SYSTEMS / PAST HISTORY  As previous      EXAMINATION  The patient presents alert and oriented with normal affect.  Circulatory status of the limb is normal. Neurological examination of the affected limb reveals no abnormalities.     Correct alignment clinically is satisfactory.  The pin sites certainly has settled down, and all of the pin sites satisfactory.    IMAGING   Radiographs demonstrate mechanical axis of that intersects the middle of his tibial spine.  We shortened the lateral struts, to take the weightbearing line slightly more laterally and repeated the radiographs today.  Subsequent to this we performed another 2 mm of shortening on the 2 lateral struts.    ASSESSMENT  Kirit is progressing well.  He can continue to bear some weight through the limb.  We will see him in 3 weeks time with updated imaging.    - Dr. Brian Huang (Orthopaedic Fellow)       I agree with the fellow physician and the plan outlined, and saw the patient.  Shaheed Stallworth      Again, thank you for allowing me to participate in the care of your patient.      Sincerely,    Shaheed Stallworth MD

## 2019-02-11 ENCOUNTER — ANCILLARY PROCEDURE (OUTPATIENT)
Dept: GENERAL RADIOLOGY | Facility: CLINIC | Age: 34
End: 2019-02-11
Attending: NURSE PRACTITIONER
Payer: COMMERCIAL

## 2019-02-11 ENCOUNTER — ANCILLARY PROCEDURE (OUTPATIENT)
Dept: GENERAL RADIOLOGY | Facility: CLINIC | Age: 34
End: 2019-02-11
Attending: ORTHOPAEDIC SURGERY
Payer: COMMERCIAL

## 2019-02-11 ENCOUNTER — OFFICE VISIT (OUTPATIENT)
Dept: ORTHOPEDICS | Facility: CLINIC | Age: 34
End: 2019-02-11
Payer: COMMERCIAL

## 2019-02-11 DIAGNOSIS — M21.969 TIBIAL DEFORMITY, ACQUIRED: Primary | ICD-10-CM

## 2019-02-11 DIAGNOSIS — M21.70 LEG LENGTH DIFFERENCE, ACQUIRED: ICD-10-CM

## 2019-02-11 DIAGNOSIS — M21.969 TIBIAL DEFORMITY, ACQUIRED: ICD-10-CM

## 2019-02-11 NOTE — LETTER
2/11/2019       RE: Kirit Logan  [address]  [facility] [address]     Dear Colleague,    Thank you for referring your patient, Kirit Logan, to the [facility] at [facility]. Please see a copy of my visit note below.    HISTORY  Kirit is now 6 weeks post application of Harleen spatial frame for his right proximal tibia for correction of a previous high tibial osteotomy.  He ceased formal correction 4 weeks ago, since then we have been compressing at the osteotomy site to encourage healing.  He reports no interval symptoms, and the course of antibiotics prescribed for the proximal medial pin site infection has been completed, with the site looking satisfactory and causing any pain to the area currently.    REVIEW OF SYSTEMS / PAST HISTORY  As previous    EXAMINATION  Insert stable look satisfactory.  Kirit alignment clinically is appropriate    IMAGING   Radiographs demonstrate good compression at the osteotomy site.  We have further shorten the 2 lateral struts by 2 mm each, and length and the 2 medial struts by 2 mm H, in order to move his weightbearing axis very slightly more laterally.    ASSESSMENT  Kirit's osteotomies uniting well, and the adjustment today has placed his mechanical axis in the ideal position.  We will review him in 2 weeks time with new imaging and consider further compression at that point.      - Dr. Brian Huang (Orthopaedic Fellow)     I agree with the fellow physician and the plan outlined, and saw the patient.    Shaheed Stallworth

## 2019-02-11 NOTE — PROGRESS NOTES
HISTORY  Kirit is now 6 weeks post application of Harleen spatial frame for his right proximal tibia for correction of a previous high tibial osteotomy.  He ceased formal correction 4 weeks ago, since then we have been compressing at the osteotomy site to encourage healing.  He reports no interval symptoms, and the course of antibiotics prescribed for the proximal medial pin site infection has been completed, with the site looking satisfactory and causing any pain to the area currently.    REVIEW OF SYSTEMS / PAST HISTORY  As previous      EXAMINATION  Insert stable look satisfactory.  Kirit alignment clinically is appropriate    IMAGING   Radiographs demonstrate good compression at the osteotomy site.  We have further shorten the 2 lateral struts by 2 mm each, and length and the 2 medial struts by 2 mm H, in order to move his weightbearing axis very slightly more laterally.    ASSESSMENT  Kirit's osteotomies uniting well, and the adjustment today has placed his mechanical axis in the ideal position.  We will review him in 2 weeks time with new imaging and consider further compression at that point.      - Dr. Brian Huang (Orthopaedic Fellow)     Answers for HPI/ROS submitted by the patient on 2/11/2019   General Symptoms: No  Skin Symptoms: No  HENT Symptoms: No  EYE SYMPTOMS: No  HEART SYMPTOMS: No  LUNG SYMPTOMS: No  INTESTINAL SYMPTOMS: No  URINARY SYMPTOMS: No  REPRODUCTIVE SYMPTOMS: No  SKELETAL SYMPTOMS: No  BLOOD SYMPTOMS: No  NERVOUS SYSTEM SYMPTOMS: No  MENTAL HEALTH SYMPTOMS: No    I agree with the fellow physician and the plan outlined, and saw the patient.  Shaheed Stallworth

## 2019-02-11 NOTE — PROGRESS NOTES
Answers for HPI/ROS submitted by the patient on 2/11/2019   General Symptoms: No  Skin Symptoms: No  HENT Symptoms: No  EYE SYMPTOMS: No  HEART SYMPTOMS: No  LUNG SYMPTOMS: No  INTESTINAL SYMPTOMS: No  URINARY SYMPTOMS: No  REPRODUCTIVE SYMPTOMS: No  SKELETAL SYMPTOMS: No  BLOOD SYMPTOMS: No  NERVOUS SYSTEM SYMPTOMS: No  MENTAL HEALTH SYMPTOMS: No

## 2019-02-14 ENCOUNTER — TELEPHONE (OUTPATIENT)
Dept: ORTHOPEDICS | Facility: CLINIC | Age: 34
End: 2019-02-14

## 2019-02-14 DIAGNOSIS — M21.969 TIBIAL DEFORMITY, ACQUIRED: Primary | ICD-10-CM

## 2019-02-14 DIAGNOSIS — M21.061 ACQUIRED GENU VALGUM OF RIGHT KNEE: Primary | ICD-10-CM

## 2019-02-14 RX ORDER — CEPHALEXIN 500 MG/1
500 CAPSULE ORAL 2 TIMES DAILY
Qty: 20 CAPSULE | Refills: 0 | Status: SHIPPED | OUTPATIENT
Start: 2019-02-14 | End: 2019-03-11

## 2019-02-14 NOTE — TELEPHONE ENCOUNTER
Contacted by Kirit 2/14/19  The proximal medial pin sites appear inflammed again with small amount of fluid expressible  Given Hx I have provided another script for Keflex  No evidence of lucency on XRs Monday 2/11/19.     If this does not settle down with usual cleaning protocol and antibiotics, we should see Kirit on Monday in clinic    - Dr. Brian Huang (Orthopaedic Fellow)    Pager [phone]

## 2019-02-21 ENCOUNTER — ANCILLARY PROCEDURE (OUTPATIENT)
Dept: GENERAL RADIOLOGY | Facility: CLINIC | Age: 34
End: 2019-02-21
Attending: ORTHOPAEDIC SURGERY
Payer: COMMERCIAL

## 2019-02-21 ENCOUNTER — ANCILLARY PROCEDURE (OUTPATIENT)
Dept: GENERAL RADIOLOGY | Facility: CLINIC | Age: 34
End: 2019-02-21
Attending: STUDENT IN AN ORGANIZED HEALTH CARE EDUCATION/TRAINING PROGRAM
Payer: COMMERCIAL

## 2019-02-21 ENCOUNTER — OFFICE VISIT (OUTPATIENT)
Dept: ORTHOPEDICS | Facility: CLINIC | Age: 34
End: 2019-02-21
Payer: COMMERCIAL

## 2019-02-21 DIAGNOSIS — M21.969 TIBIAL DEFORMITY, ACQUIRED: ICD-10-CM

## 2019-02-21 DIAGNOSIS — M21.061 ACQUIRED GENU VALGUM OF RIGHT KNEE: Primary | ICD-10-CM

## 2019-02-21 ASSESSMENT — ENCOUNTER SYMPTOMS
ARTHRALGIAS: 1
TREMORS: 0
LOSS OF CONSCIOUSNESS: 0
PARALYSIS: 0
SPEECH CHANGE: 0
STIFFNESS: 1
SEIZURES: 0
BACK PAIN: 0
WEAKNESS: 0
MYALGIAS: 1
TINGLING: 1
DISTURBANCES IN COORDINATION: 0
MUSCLE CRAMPS: 0
NUMBNESS: 1
HEADACHES: 0
DIZZINESS: 0
MEMORY LOSS: 0

## 2019-02-21 NOTE — LETTER
2/21/2019       RE: Kirit Logan  [address]  [facility] [address]     Dear Colleague,    Thank you for referring your patient, Kirit Logan, to the [facility] at [facility]. Please see a copy of my visit note below.    HISTORY  Kirit is in the consolidation phase of his right proximal tibial gradual correction with Harleen spatial frame.  He returns today because of increasing pain distally and a bleeding pin sites.  This occurred over the weekend, the bleeding has now resolved but the pain radiating from the most distal tibial half pin down into his foot persists.  It does not sound neuropathic in nature.  Nothing particular stress the pain up, but he has noted that the area distally is slightly tender.  He completed a course of oral antibiotics from a proximal pin site infection.  Kirit does not feel systemically unwell.    REVIEW OF SYSTEMS / PAST HISTORY  As previous    EXAMINATION  The patient presents alert and oriented with normal affect.  Circulatory status of the limb is normal. Neurological examination of the affected limb reveals no abnormalities.     The pin sites proximally and a half pin sites distally all appear dry, satisfactory, and consistent with the length of time that they have been in place.  There is no evidence of infection or problems.  The frame itself is quite secure.  There is some tenderness over his distal tibia, notably the tibialis anterior and extensor hallucis longus tendons are intact.  Stressing them does not provoke the pain particularly.    IMAGING   Radiographs demonstrate  appropriate position of the osteotomy.  There is good bridging callus posteriorly.  Tibial spine.  There is a little bit of opening anteriorly.  The overall weightbearing line passes just medial to the lateral aspect of the because of these 2 considerations, I have applied 1 mm of compression on the 2 lateral and two anterior struts.    A second  x-ray was taken in the oblique plane, to exclude any ostial lysis around the distal half pins.  No ostial lysis was noted.    ASSESSMENT  Kirit had an increase in pain around the distal part of his fixator.  I cannot identify a soft tissue, infectious, or bone, reason that would be concerning at this point as a cause for this pain.  I reassured him that the frame is satisfactory, there is no evidence of fracture, and that his healing is going well with a view to having the frame off and not too far in the future.  We will move his next reviewed forward 1 week and review him again on Monday 4 March    - Dr. Brian Huang (Orthopaedic Fellow)     This plan of care was discussed with Dr. Stallworth who agrees with above plan of care and did review the xrays.    Again, thank you for allowing me to participate in the care of your patient.      Sincerely,    ARAM Parker CNP

## 2019-03-04 ENCOUNTER — OFFICE VISIT (OUTPATIENT)
Dept: ORTHOPEDICS | Facility: CLINIC | Age: 34
End: 2019-03-04
Payer: COMMERCIAL

## 2019-03-04 ENCOUNTER — ANCILLARY PROCEDURE (OUTPATIENT)
Dept: GENERAL RADIOLOGY | Facility: CLINIC | Age: 34
End: 2019-03-04
Attending: ORTHOPAEDIC SURGERY
Payer: COMMERCIAL

## 2019-03-04 DIAGNOSIS — M21.969 TIBIAL DEFORMITY, ACQUIRED: ICD-10-CM

## 2019-03-04 DIAGNOSIS — M21.70 LEG LENGTH DIFFERENCE, ACQUIRED: Primary | ICD-10-CM

## 2019-03-04 DIAGNOSIS — M21.969 TIBIAL DEFORMITY, ACQUIRED: Primary | ICD-10-CM

## 2019-03-04 ASSESSMENT — ENCOUNTER SYMPTOMS
SEIZURES: 0
STIFFNESS: 0
TREMORS: 0
SPEECH CHANGE: 0
LOSS OF CONSCIOUSNESS: 0
ARTHRALGIAS: 0
JOINT SWELLING: 0
TINGLING: 1
WEAKNESS: 0
MYALGIAS: 0
DISTURBANCES IN COORDINATION: 0
DIZZINESS: 0
NUMBNESS: 1
BACK PAIN: 0
MUSCLE WEAKNESS: 0
PARALYSIS: 0
HEADACHES: 0
NECK PAIN: 0
MEMORY LOSS: 0

## 2019-03-04 NOTE — LETTER
3/4/2019       RE: Kirit Logan  [address]  [facility] [address]     Dear Colleague,    Thank you for referring your patient, Kirit Logan, to the [facility] at [facility] Please see a copy of my visit note below.    HISTORY  Kirit presents 8 weeks post application of Harleen spatial frame to right proximal tibia for correction of previously overcorrected HTO.  He ceased his correction in the second week in January.  We have been compressing the osteotomy since.    Kirit reports no interval increase in pain, the pin site infection that he had has settled down, he does however express some frustration at length the time in the frame, but acknowledges that he was aware of this prior to proceeding    REVIEW OF SYSTEMS / PAST HISTORY  As prev    EXAMINATION  Kirit presents with normal affect and is alert and oriented x3.  He has satisfactory pin sites.  The skin is intact.  Circulatory and neurological status distally is intact.    IMAGING   Radiographs demonstrate ideal alignment and good healing at the osteotomy.    ASSESSMENT  We now must start training the regenerative to heal more by him such that the frame can be removed soon.  We will commence despite exchanging his TSF struts to standard rods, and allowing some dynamization in the system.  See dictated procedure note.      - Dr. Brian Huang (Orthopaedic Fellow)     Procedure: Exchange of struts and dynamization right tibia harleen spatial frame.    Proceduralists: Dr. MARCO Stallworth MD, Dr. JANNET Huang MD    Description: Kirit is completed his correction and is forming good bone.  In order to encourage further going to form at the osteotomy site, we want to build in some relative dynamization into the frame.  We selected 3 points on the 5/8 ring above there would allow a triangle of balanced fixation for the 3 standard Ilizarov rods.  We then selected a 3 rods of an appropriate length that we had to build  these using Rancho cubes to make them correct.  Clinical washers were used on either side of all 3 rods at both rings.  For the posterior right medial maricarmen, we had to build out regret in order to achieve a good alignment.  At the completion of installation of all 3 rods, the fixation was balanced, and parallel with the tibia in both planes, thereby allowing appropriate compression.    Following this the Harleen spatial struts were removed, with minimal discomfort for Kirit.  We then loosened the nuts at the top to allow the system to become somewhat dynamic.  1 of the rods we used a nylon nut, the other 2 we used to stand and not thereby locking against each other such that the knots would not worked themselves loose.     We instructed Kirit in how to tighten the frame up, if his pain became too much.  The procedure was tolerated with minimal discomfort    I, Dr. Shaheed Stallworth, agree with above plan of care and examination.    Total time spent was 45 minutes throughout this procedure.    Again, thank you for allowing me to participate in the care of your patient.      Sincerely,    Shaheed Stallworth MD

## 2019-03-05 NOTE — PROGRESS NOTES
HISTORY  Kirit presents 8 weeks post application of Harleen spatial frame to right proximal tibia for correction of previously overcorrected HTO.  He ceased his correction in the second week in January.  We have been compressing the osteotomy since.    Kirit reports no interval increase in pain, the pin site infection that he had has settled down, he does however express some frustration at length the time in the frame, but acknowledges that he was aware of this prior to proceeding    REVIEW OF SYSTEMS / PAST HISTORY  As prev      EXAMINATION  Kirit presents with normal affect and is alert and oriented x3.  He has satisfactory pin sites.  The skin is intact.  Circulatory and neurological status distally is intact.    IMAGING   Radiographs demonstrate ideal alignment and good healing at the osteotomy.    ASSESSMENT  We now must start training the regenerative to heal more by him such that the frame can be removed soon.  We will commence despite exchanging his TSF struts to standard rods, and allowing some dynamization in the system.  See dictated procedure note.      - Dr. Brian Huang (Orthopaedic Fellow)

## 2019-03-05 NOTE — PROGRESS NOTES
Procedure: Exchange of struts and dynamization right tibia harleen spatial frame.    Proceduralists: Dr. MARCO Stallworth MD, Dr. JANNET Huang MD    Description: Kirit is completed his correction and is forming good bone.  In order to encourage further going to form at the osteotomy site, we want to build in some relative dynamization into the frame.  We selected 3 points on the 5/8 ring above there would allow a triangle of balanced fixation for the 3 standard Ilizarov rods.  We then selected a 3 rods of an appropriate length that we had to build these using Rancho cubes to make them correct.  Clinical washers were used on either side of all 3 rods at both rings.  For the posterior right medial maricarmen, we had to build out regret in order to achieve a good alignment.  At the completion of installation of all 3 rods, the fixation was balanced, and parallel with the tibia in both planes, thereby allowing appropriate compression.    Following this the Harleen spatial struts were removed, with minimal discomfort for Kirit.  We then loosened the nuts at the top to allow the system to become somewhat dynamic.  1 of the rods we used a nylon nut, the other 2 we used to stand and not thereby locking against each other such that the knots would not worked themselves loose.     We instructed Kirit in how to tighten the frame up, if his pain became too much.  The procedure was tolerated with minimal discomfort      I, Dr. Shaheed Stallworth, agree with above plan of care and examination.    Total time spent was 45 minutes throughout this procedure.        Answers for HPI/ROS submitted by the patient on 3/4/2019   General Symptoms: No  Skin Symptoms: No  HENT Symptoms: No  EYE SYMPTOMS: No  HEART SYMPTOMS: No  LUNG SYMPTOMS: No  INTESTINAL SYMPTOMS: No  URINARY SYMPTOMS: No  REPRODUCTIVE SYMPTOMS: No  SKELETAL SYMPTOMS: Yes  BLOOD SYMPTOMS: No  NERVOUS SYSTEM SYMPTOMS: Yes  MENTAL HEALTH SYMPTOMS: No  Back pain: No  Muscle aches: No  Neck pain: No  Swollen  joints: No  Joint pain: No  Bone pain: Yes  Muscle weakness: No  Joint stiffness: No  Bone fracture: No  Trouble with coordination: No  Dizziness or trouble with balance: No  Fainting or black-out spells: No  Memory loss: No  Headache: No  Seizures: No  Speech problems: No  Tingling: Yes  Tremor: No  Weakness: No  Difficulty walking: Yes  Paralysis: No  Numbness: Yes

## 2019-03-07 DIAGNOSIS — M21.969 TIBIAL DEFORMITY, ACQUIRED: Primary | ICD-10-CM

## 2019-03-11 ENCOUNTER — ANCILLARY PROCEDURE (OUTPATIENT)
Dept: GENERAL RADIOLOGY | Facility: CLINIC | Age: 34
End: 2019-03-11
Attending: ORTHOPAEDIC SURGERY
Payer: COMMERCIAL

## 2019-03-11 ENCOUNTER — OFFICE VISIT (OUTPATIENT)
Dept: ORTHOPEDICS | Facility: CLINIC | Age: 34
End: 2019-03-11
Payer: COMMERCIAL

## 2019-03-11 ENCOUNTER — OFFICE VISIT (OUTPATIENT)
Dept: SURGERY | Facility: CLINIC | Age: 34
End: 2019-03-11
Payer: COMMERCIAL

## 2019-03-11 ENCOUNTER — ANESTHESIA EVENT (OUTPATIENT)
Dept: SURGERY | Facility: AMBULATORY SURGERY CENTER | Age: 34
End: 2019-03-11

## 2019-03-11 VITALS
HEIGHT: 70 IN | TEMPERATURE: 98 F | WEIGHT: 187 LBS | BODY MASS INDEX: 26.77 KG/M2 | HEART RATE: 67 BPM | SYSTOLIC BLOOD PRESSURE: 120 MMHG | DIASTOLIC BLOOD PRESSURE: 80 MMHG | OXYGEN SATURATION: 98 %

## 2019-03-11 DIAGNOSIS — M21.969 TIBIAL DEFORMITY, ACQUIRED: ICD-10-CM

## 2019-03-11 DIAGNOSIS — Z98.890 STATUS POST OSTEOTOMY: ICD-10-CM

## 2019-03-11 DIAGNOSIS — Z01.818 PREOP EXAMINATION: ICD-10-CM

## 2019-03-11 DIAGNOSIS — M21.969 TIBIAL DEFORMITY, ACQUIRED: Primary | ICD-10-CM

## 2019-03-11 DIAGNOSIS — Z01.818 PREOP EXAMINATION: Primary | ICD-10-CM

## 2019-03-11 LAB
CREAT SERPL-MCNC: 0.89 MG/DL (ref 0.66–1.25)
ERYTHROCYTE [DISTWIDTH] IN BLOOD BY AUTOMATED COUNT: 11.5 % (ref 10–15)
GFR SERPL CREATININE-BSD FRML MDRD: >90 ML/MIN/{1.73_M2}
HCT VFR BLD AUTO: 44.2 % (ref 40–53)
HGB BLD-MCNC: 14.6 G/DL (ref 13.3–17.7)
MCH RBC QN AUTO: 28.3 PG (ref 26.5–33)
MCHC RBC AUTO-ENTMCNC: 33 G/DL (ref 31.5–36.5)
MCV RBC AUTO: 86 FL (ref 78–100)
PLATELET # BLD AUTO: 234 10E9/L (ref 150–450)
POTASSIUM SERPL-SCNC: 4 MMOL/L (ref 3.4–5.3)
RBC # BLD AUTO: 5.16 10E12/L (ref 4.4–5.9)
WBC # BLD AUTO: 4.7 10E9/L (ref 4–11)

## 2019-03-11 ASSESSMENT — LIFESTYLE VARIABLES: TOBACCO_USE: 0

## 2019-03-11 ASSESSMENT — MIFFLIN-ST. JEOR: SCORE: 1799.48

## 2019-03-11 NOTE — LETTER
3/11/2019       RE: Kirit Logan  [address]  [facility] [address]     Dear Colleague,    Thank you for referring your patient, Kirit Logan, to the [facility] at [facility]. Please see a copy of my visit note below.      HISTORY  Kirit returns today 10 weeks post application of Harleen spatial frame right proximal tibia for HDL correction.  On his last review 7 days ago, his struts were exchanged to 3 standard rods, with some movement left with the proximal ring such that he could bear weight through the healed spine.  Kirit reports that this went well with no increasing pain around the osteotomy site.  He continues to have a moderate amount of pain distally around the 2 half pins.      EXAMINATION  The patient presents alert and oriented with normal affect.  Circulatory status of the limb is normal. Neurological examination of the affected limb reveals no abnormalities.     There is no tenderness at the regenerate site.    IMAGING   Radiographs demonstrate ideal alignment as well as good bony bridging of the osteotomy site    ASSESSMENT  We will schedule Kirit for removal of his TSF in the OR on Thursday of this week.    - Dr. Brian Huang (Orthopaedic Fellow)     I, Dr. Shaheed Stallworth, agree with above examination and outlined treatment plan.       Again, thank you for allowing me to participate in the care of your patient.      Sincerely,    Shaheed Stallworth MD

## 2019-03-11 NOTE — ANESTHESIA PREPROCEDURE EVALUATION
Anesthesia Pre-Procedure Evaluation    Patient: Kirit Logan   MRN:     [id number] Gender:   male   Age:    33 year old :      1985        Preoperative Diagnosis: Healed Osteotomy, Right   Procedure(s):  Removal Right Tibial Fixator     Past Medical History:   Diagnosis Date     History of DVT (deep vein thrombosis)     lower extremity post op     iamSPRAIN CRUCIATE LIG KNEE 2006      Past Surgical History:   Procedure Laterality Date     APPLY EXTERNAL FIXATOR ILIZAROV (LOCATION) Right 2018    Procedure: APPLY EXTERNAL FIXATOR ILIZAROV (LOCATION);  Surgeon: Shaheed Stallworth MD;  [facility]     ARTHROSCOPY KNEE  2015    X2     ORTHOPEDIC SURGERY      ACL reconstruction      OSTEOTOMY TIBIA Right 2018    Procedure: RIGHT HIGH TIBIAL OSTEOTOMY,RIGHT GERRY SPATIAL FRAME;  Surgeon: Shaheed Stallworth MD;  [facility]     REMOVE HARDWARE LOWER EXTREMITY Right 2018    Procedure: Right Tibial Plate Removal;  Surgeon: Shaheed Stallworth MD;  [facility]          Anesthesia Evaluation     . Pt has had prior anesthetic. Type: Regional and General    No history of anesthetic complications          ROS/MED HX    ENT/Pulmonary:      (-) tobacco use   Neurologic:  - neg neurologic ROS     Cardiovascular:  - neg cardiovascular ROS       METS/Exercise Tolerance:  >4 METS   Hematologic:     (+) History of blood clots pt is anticoagulated, Anemia, -     (-) History of Transfusion   Musculoskeletal:   (+) , , other musculoskeletal- right lower leg TSF in place      GI/Hepatic:  - neg GI/hepatic ROS       Renal/Genitourinary:  - ROS Renal section negative       Endo:  - neg endo ROS       Psychiatric:  - neg psychiatric ROS       Infectious Disease:  - neg infectious disease ROS       Malignancy:      - no malignancy   Other:    (+) no H/O Chronic Pain,                       PHYSICAL EXAM:   Mental Status/Neuro: A/A/O   Airway: Facies: Feasible  Mallampati: I  Mouth/Opening: Full  TM  "distance: > 6 cm  Neck ROM: Full   Respiratory: Auscultation: CTAB     Resp. Rate: Normal     Resp. Effort: Normal      CV: Rhythm: Regular  Rate: Age appropriate  Heart: Normal Sounds   Comments:      Dental: Normal                  Lab Results   Component Value Date    HGB 12.3 (L) 12/22/2018    GLC 87 12/21/2018       Preop Vitals  BP Readings from Last 3 Encounters:   03/11/19 120/80   12/22/18 125/69   12/13/18 133/80    Pulse Readings from Last 3 Encounters:   03/11/19 67   12/21/18 84   12/13/18 69      Resp Readings from Last 3 Encounters:   12/22/18 16   09/25/13 18   03/04/12 16    SpO2 Readings from Last 3 Encounters:   03/11/19 98%   12/22/18 97%   12/13/18 97%      Temp Readings from Last 1 Encounters:   03/11/19 98  F (36.7  C) (Oral)    Ht Readings from Last 1 Encounters:   03/11/19 1.778 m (5' 10\")      Wt Readings from Last 1 Encounters:   03/11/19 84.8 kg (187 lb)    Estimated body mass index is 26.83 kg/m  as calculated from the following:    Height as of this encounter: 1.778 m (5' 10\").    Weight as of this encounter: 84.8 kg (187 lb).     LDA:  Peripheral IV 12/21/18 Left Lower forearm (Active)   Number of days: 80            Assessment:   ASA SCORE: 1    NPO Status: > 2 hours since completed Clear Liquids; > 6 hours since completed Solid Foods   Documentation: H&P complete; Preop Testing complete; Consents complete   Proceeding: Proceed without further delay  Tobacco Use:  NO Active use of Tobacco/UNKNOWN Tobacco use status     Plan:   Anes. Type:  MAC   Pre-Induction: Acetaminophen PO; Gabapentin PO   Induction:  IV (Standard)   Airway: Native Airway   Access/Monitoring: PIV   Maintenance: Propofol; IV   Emergence: Procedure Site   Logistics: Same Day Surgery     Postop Pain/Sedation Strategy:  Standard-Options: Opioids PRN     PONV Management:  Adult Risk Factors:, Non-Smoker, Postop Opioids  Prevention: Propofol Infusion; Ondansetron     CONSENT: Direct conversation   Plan and risks " discussed with: Patient   Blood Products: Consent Deferred (Minimal Blood Loss)                  PAC Discussion and Assessment    ASA Classification: 1  Case is suitable for: ASC  Anesthetic techniques and relevant risks discussed: MAC with GA as backup  Invasive monitoring and risk discussed:   Types:   Possibility and Risk of blood transfusion discussed:   NPO instructions given:   Additional anesthetic preparation and risks discussed:   Needs early admission to pre-op area:   Other:     PAC Resident/NP Anesthesia Assessment:  Kirit Logan is a 33 year old male scheduled for a Removal Right Tibial Fixator on 3/14/2019 by Dr. Stallworth in treatment of a healed right osteotomy.  PAC referral for risk assessment and optimization for anesthesia with comorbid conditions of: anemia and history of post-op DVT.     Pre-operative considerations:  1.  Cardiac:  Functional status- METS >4.  Prior to his last surgery in December 2018 he was running for exercise.  He has no known cardiac conditions.  Low risk surgery with 0.4% risk of major adverse cardiac event.   2.  Pulm:  Airway feasible.  AGUSTIN risk: low. Non-smoker.   3.  GI:  Risk of PONV score = 1.  If > 2, anti-emetic intervention recommended.  4. Heme:  He had previous anemia noted, but his hgb today is WNL.  He had a post-op DVT in 2017 s/p a RLE ortho surgery.  He is no long anticoagulated.  VTE risk: 1.8%      Patient is optimized and is acceptable candidate for the proposed procedure.  No further diagnostic evaluation is needed.     **For further details of assessment, testing, and physical exam please see H and P completed on same date.          Claire Nunez DNP, RN, APRN      Reviewed and Signed by PAC Mid-Level Provider/Resident  Mid-Level Provider/Resident: Claire Nunez DNP, RN, APRN  Date: 3/11/2019  Time: 1722    Attending Anesthesiologist Anesthesia Assessment:        Anesthesiologist:   Date:   Time:   Pass/Fail:   Disposition:     PAC Pharmacist  Assessment:        Pharmacist:   Date:   Time:        ARAM Pinto CNP

## 2019-03-11 NOTE — PROGRESS NOTES
HISTORY  Kirit returns today 10 weeks post application of Harleen spatial frame right proximal tibia for HDL correction.  On his last review 7 days ago, his struts were exchanged to 3 standard rods, with some movement left with the proximal ring such that he could bear weight through the healed spine.  Kirit reports that this went well with no increasing pain around the osteotomy site.  He continues to have a moderate amount of pain distally around the 2 half pins.      EXAMINATION  The patient presents alert and oriented with normal affect.  Circulatory status of the limb is normal. Neurological examination of the affected limb reveals no abnormalities.     There is no tenderness at the regenerate site.    IMAGING   Radiographs demonstrate ideal alignment as well as good bony bridging of the osteotomy site    ASSESSMENT  We will schedule Kirit for removal of his TSF in the OR on Thursday of this week.    - Dr. Brian Huang (Orthopaedic Fellow)     Answers for HPI/ROS submitted by the patient on 3/11/2019   General Symptoms: No  Skin Symptoms: No  HENT Symptoms: No  EYE SYMPTOMS: No  HEART SYMPTOMS: No  LUNG SYMPTOMS: No  INTESTINAL SYMPTOMS: No  URINARY SYMPTOMS: No  REPRODUCTIVE SYMPTOMS: No  SKELETAL SYMPTOMS: No  BLOOD SYMPTOMS: No  NERVOUS SYSTEM SYMPTOMS: No  MENTAL HEALTH SYMPTOMS: No    I, Dr. Shaheed Stallworth, agree with above examination and outlined treatment plan.

## 2019-03-11 NOTE — H&P
Pre-Operative H & P     CC:  Preoperative exam to assess for increased cardiopulmonary risk while undergoing surgery and anesthesia.    Date of Encounter: 3/11/2019  Primary Care Physician:  Mode Wilson  Associated diagnosis:  Healed right osteotomy    HPI  Kirit Loagn is a 33 year old male who presents for pre-operative H & P in preparation for a Removal Right Tibial Fixator with Dr. Stallworth on 3/14/19 at [facility].     Kirit Logan is a 33 year old male with anemia and history of post-op DVT that is s/p tight high tibial osteotomy with right gerry spatial frame placement done on 12/21/2018.  He has had multiple right lower leg surgeries since a traumatic injury to the leg in 2007.   He reports that a surgery in 2017 left him with a leg deformity, thus why he sought care with Dr. Stallworth here and underwent the last surgery in December.  He follow up with Dr. Stallworth today and the above listed surgery has now been recommended for further management.    History is obtained from the patient and the medical record.     Past Medical History  Past Medical History:   Diagnosis Date     History of DVT (deep vein thrombosis)     lower extremity post op     iamSPRAIN CRUCIATE LIG KNEE 5/26/2006       Past Surgical History  Past Surgical History:   Procedure Laterality Date     APPLY EXTERNAL FIXATOR ILIZAROV (LOCATION) Right 12/21/2018    Procedure: APPLY EXTERNAL FIXATOR ILIZAROV (LOCATION);  Surgeon: Shaheed Stallworth MD;  [facility]     ARTHROSCOPY KNEE  2015    X2     ORTHOPEDIC SURGERY  2010    ACL reconstruction      OSTEOTOMY TIBIA Right 12/21/2018    Procedure: RIGHT HIGH TIBIAL OSTEOTOMY,RIGHT GERRY SPATIAL FRAME;  Surgeon: Shaheed Stallworth MD;  [facility]     REMOVE HARDWARE LOWER EXTREMITY Right 12/21/2018    Procedure: Right Tibial Plate Removal;  Surgeon: Shaheed Stallworth MD;  [facility]       Hx of Blood transfusions/reactions: none     Hx of abnormal bleeding or  anti-platelet use: none      Steroid use in the last year: none     Personal or FH with difficulty with Anesthesia:  none    Prior to Admission Medications  Current Outpatient Medications   Medication Sig Dispense Refill     order for DME Equipment being ordered: Walker Wheels () and Walker ()  Treatment Diagnosis: Gait instablity 1 each 0       Allergies  No Known Allergies    Social History  Social History     Socioeconomic History     Marital status: Single     Spouse name: Not on file     Number of children: 2     Years of education: Not on file     Highest education level: Not on file   Occupational History     Occupation: unemployed   Social Needs     Financial resource strain: Not on file     Food insecurity:     Worry: Not on file     Inability: Not on file     Transportation needs:     Medical: Not on file     Non-medical: Not on file   Tobacco Use     Smoking status: Never Smoker     Smokeless tobacco: Never Used   Substance and Sexual Activity     Alcohol use: Yes     Comment: socially     Drug use: No     Sexual activity: Not on file   Lifestyle     Physical activity:     Days per week: Not on file     Minutes per session: Not on file     Stress: Not on file   Relationships     Social connections:     Talks on phone: Not on file     Gets together: Not on file     Attends Yazidism service: Not on file     Active member of club or organization: Not on file     Attends meetings of clubs or organizations: Not on file     Relationship status: Not on file     Intimate partner violence:     Fear of current or ex partner: Not on file     Emotionally abused: Not on file     Physically abused: Not on file     Forced sexual activity: Not on file   Other Topics Concern     Not on file   Social History Narrative     Not on file       Family History  Family History   Problem Relation Age of Onset     No Known Problems Mother      No Known Problems Father      No Known Problems Sister      No Known Problems  "Brother      No Known Problems Brother      No Known Problems Half-Brother      No Known Problems Half-Brother      No Known Problems Half-Brother      No Known Problems Half-Brother      No Known Problems Half-Sister      No Known Problems Half-Sister              ROS/MED HX  The complete review of systems is negative other than noted in the HPI or here.   ENT/Pulmonary:      (-) tobacco use   Neurologic:  - neg neurologic ROS     Cardiovascular:  - neg cardiovascular ROS       METS/Exercise Tolerance:  >4 METS   Hematologic:     (+) History of blood clots pt is anticoagulated, Anemia, -     (-) History of Transfusion   Musculoskeletal:   (+) , , other musculoskeletal- right lower leg TSF in place      GI/Hepatic:  - neg GI/hepatic ROS       Renal/Genitourinary:  - ROS Renal section negative       Endo:  - neg endo ROS       Psychiatric:  - neg psychiatric ROS       Infectious Disease:  - neg infectious disease ROS       Malignancy:      - no malignancy   Other:    (+) no H/O Chronic Pain,                       PHYSICAL EXAM:   Mental Status/Neuro: A/A/O   Airway: Facies: Feasible  Mallampati: I  Mouth/Opening: Full  TM distance: > 6 cm  Neck ROM: Full   Respiratory: Auscultation: CTAB     Resp. Rate: Normal     Resp. Effort: Normal      CV: Rhythm: Regular  Rate: Age appropriate  Heart: Normal Sounds   Comments:      Dental: Normal                Temp: 98  F (36.7  C) Temp src: Oral BP: 120/80 Pulse: 67     SpO2: 98 %         187 lbs 0 oz  5' 10\"   Body mass index is 26.83 kg/m .       Physical Exam  Constitutional: Awake, alert, cooperative, no apparent distress, and appears stated age.  Eyes: Pupils equal, round and reactive to light, extra ocular muscles intact, sclera clear, conjunctiva normal.  HENT: Normocephalic, oral pharynx with moist mucus membranes, good dentition. No goiter appreciated.   Respiratory: Clear to auscultation bilaterally, no crackles or wheezing.  Cardiovascular: Regular rate and rhythm, " normal S1 and S2, and no murmur noted.  Carotids +2, no bruits. No edema. Palpable pulses to radial  DP and PT arteries.   GI: Normal bowel sounds, soft, non-distended, non-tender, no masses palpated, no hepatosplenomegaly.    Lymph/Hematologic: No cervical lymphadenopathy and no supraclavicular lymphadenopathy.  Genitourinary:  deferred  Skin: Warm and dry.  No rashes at anticipated surgical site.    Musculoskeletal: Full ROM of neck. There is no redness, warmth, or swelling of the joints. RLE external fixator in place. Gross motor strength is normal.    Neurologic: Awake, alert, oriented to name, place and time. Cranial nerves II-XII are grossly intact. Gait is normal.   Neuropsychiatric: Calm, cooperative. Normal affect.     Labs: (personally reviewed)   Component      Latest Ref Rng & Units 3/11/2019   WBC      4.0 - 11.0 10e9/L 4.7   RBC Count      4.4 - 5.9 10e12/L 5.16   Hemoglobin      13.3 - 17.7 g/dL 14.6   Hematocrit      40.0 - 53.0 % 44.2   MCV      78 - 100 fl 86   MCH      26.5 - 33.0 pg 28.3   MCHC      31.5 - 36.5 g/dL 33.0   RDW      10.0 - 15.0 % 11.5   Platelet Count      150 - 450 10e9/L 234   Creatinine      0.66 - 1.25 mg/dL 0.89   GFR Estimate      >60 mL/min/1.73:m2 >90   GFR Estimate If Black      >60 mL/min/1.73:m2 >90   Potassium      3.4 - 5.3 mmol/L 4.0           ASSESSMENT and PLAN  Kirit Logan is a 33 year old male scheduled for a Removal Right Tibial Fixator on 3/14/2019 by Dr. Stallworth in treatment of a healed right osteotomy.  PAC referral for risk assessment and optimization for anesthesia with comorbid conditions of: anemia and history of post-op DVT.     Pre-operative considerations:  1.  Cardiac:  Functional status- METS >4.  Prior to his last surgery in December 2018 he was running for exercise.  He has no known cardiac conditions.  Low risk surgery with 0.4% risk of major adverse cardiac event.   2.  Pulm:  Airway feasible.  AGUSTIN risk: low. Non-smoker.   3.  GI:  Risk of PONV score  = 1.  If > 2, anti-emetic intervention recommended.  4. Heme:  He had previous anemia noted, but his hgb today is WNL.  He had a post-op DVT in 2017 s/p a RLE ortho surgery.  He is no long anticoagulated.  VTE risk: 1.8%      Patient is optimized and is acceptable candidate for the proposed procedure.  No further diagnostic evaluation is needed.               Claire Nunez DNP, RN, APRN  Preoperative Assessment Center  [facility]  Clinic and Surgery Center  Phone: [phone]  Fax: [phone]

## 2019-03-11 NOTE — PATIENT INSTRUCTIONS
Preparing for Your Surgery      Name:  Kirit Logan   MRN:  [id number]   :  1985   Today's Date:  3/11/2019     Arriving for surgery:  Surgery date:  3/14/19  Arrival time:  12:30 pm  Please come to:     [facility]  [address] [place] [id number]     Parking is available in front of the Clinics and Surgery Center building from 5:30AM to 8:00PM.  -  Proceed to the 5th floor to check into the Ambulatory Surgery Center.              >> There will be patient concierges on the 1st and 5th floor, for assistance or an escort, if you would like.              >> Please call [phone] with any questions.    What can I eat or drink?  -  You may have solid food or milk products until 8 hours prior to your surgery.  -  You may have water, apple juice or 7up/Sprite until 2 hours prior to your surgery.    Which medicines can I take?  Stop Aspirin, vitamins and supplements one week prior to surgery.  Hold Ibuprofen for 24 hours and/or Naproxen for 48 hours prior to surgery.   -  Please take these medications the day of surgery:  Tylenol if needed.    How do I prepare myself?  -  Take two showers: one the night before surgery; and one the morning of surgery.         Use Scrubcare or Hibiclens to wash from neck down.  You may use your own     shampoo and conditioner. No other hair products.   -  Do NOT use lotion, powder, deodorant, or antiperspirant the day of your surgery.  -  Do NOT wear any makeup, fingernail polish or jewelry.  - Do not bring your own medications to the hospital, except for inhalers and eye   drops.  -  Bring your ID and insurance card.    Questions or Concerns:    -If you are scheduled at the Ambulatory Surgery Center please call [phone].    -For questions after surgery please call your surgeons office.

## 2019-03-11 NOTE — NURSING NOTE
Teaching Flowsheet   Relevant Diagnosis: healed osteotomy  Teaching Topic: preop removal right tibial fixator, planning surgery in 3 days.  PAC has been scheduled for preop H&P     Person(s) involved in teaching:   Patient     Motivation Level:  Asks Questions: Yes  Eager to Learn: Yes  Cooperative: Yes  Receptive (willing/able to accept information): Yes  Any cultural factors/Baptism beliefs that may influence understanding or compliance? No  Comments:      Patient demonstrates understanding of the following:  Reason for the appointment, diagnosis and treatment plan: Yes  Knowledge of proper use of medications and conditions for which they are ordered (with special attention to potential side effects or drug interactions): Yes  Which situations necessitate calling provider and whom to contact: Yes       Teaching Concerns Addressed:   Comments:      Proper use and care of medications (medical equip, care aids, etc.): Yes  Nutritional needs and diet plan: Yes  Pain management techniques: Yes  Wound Care: Yes  How and/when to access community resources: NA     Instructional Materials Used/Given: preop pkt, antiseptic soap     Time spent with patient: 15 minutes.

## 2019-03-11 NOTE — RESULT ENCOUNTER NOTE
Ken Beth,    Your test results are attached.  All of your labs are normal and good for surgery.        Claire Nunez DNP, RN, ANP-C

## 2019-03-12 ENCOUNTER — DOCUMENTATION ONLY (OUTPATIENT)
Dept: ORTHOPEDICS | Facility: CLINIC | Age: 34
End: 2019-03-12

## 2019-03-12 NOTE — PROGRESS NOTES
Patient is scheduled for surgery with Dr. Stallworth      Spoke or left message with: Patient in exam room    Date of Surgery: 3/14/19    Location: [facility]    Post ops: 2 weeks & 6 weeks    Pre-op with surgeon (if applicable): Complete    H&P: Scheduled with PAC 3/11/19    Additional imaging/appointments: N/A    Surgery packet: Received in clinic     Additional comments: N/A

## 2019-03-14 ENCOUNTER — HOSPITAL ENCOUNTER (OUTPATIENT)
Facility: AMBULATORY SURGERY CENTER | Age: 34
End: 2019-03-14
Attending: ORTHOPAEDIC SURGERY
Payer: COMMERCIAL

## 2019-03-14 ENCOUNTER — ANESTHESIA (OUTPATIENT)
Dept: SURGERY | Facility: AMBULATORY SURGERY CENTER | Age: 34
End: 2019-03-14

## 2019-03-14 VITALS
RESPIRATION RATE: 14 BRPM | SYSTOLIC BLOOD PRESSURE: 128 MMHG | HEIGHT: 70 IN | TEMPERATURE: 97.9 F | OXYGEN SATURATION: 98 % | WEIGHT: 187 LBS | BODY MASS INDEX: 26.77 KG/M2 | DIASTOLIC BLOOD PRESSURE: 76 MMHG

## 2019-03-14 DIAGNOSIS — M21.969 TIBIAL DEFORMITY, ACQUIRED: ICD-10-CM

## 2019-03-14 DIAGNOSIS — M21.061 ACQUIRED GENU VALGUM OF RIGHT KNEE: Primary | ICD-10-CM

## 2019-03-14 RX ORDER — ONDANSETRON 4 MG/1
4 TABLET, ORALLY DISINTEGRATING ORAL EVERY 30 MIN PRN
Status: DISCONTINUED | OUTPATIENT
Start: 2019-03-14 | End: 2019-03-15 | Stop reason: HOSPADM

## 2019-03-14 RX ORDER — FENTANYL CITRATE 50 UG/ML
INJECTION, SOLUTION INTRAMUSCULAR; INTRAVENOUS PRN
Status: DISCONTINUED | OUTPATIENT
Start: 2019-03-14 | End: 2019-03-14

## 2019-03-14 RX ORDER — ONDANSETRON 2 MG/ML
4 INJECTION INTRAMUSCULAR; INTRAVENOUS EVERY 30 MIN PRN
Status: DISCONTINUED | OUTPATIENT
Start: 2019-03-14 | End: 2019-03-15 | Stop reason: HOSPADM

## 2019-03-14 RX ORDER — MEPERIDINE HYDROCHLORIDE 25 MG/ML
12.5 INJECTION INTRAMUSCULAR; INTRAVENOUS; SUBCUTANEOUS
Status: DISCONTINUED | OUTPATIENT
Start: 2019-03-14 | End: 2019-03-15 | Stop reason: HOSPADM

## 2019-03-14 RX ORDER — SODIUM CHLORIDE, SODIUM LACTATE, POTASSIUM CHLORIDE, CALCIUM CHLORIDE 600; 310; 30; 20 MG/100ML; MG/100ML; MG/100ML; MG/100ML
INJECTION, SOLUTION INTRAVENOUS CONTINUOUS
Status: DISCONTINUED | OUTPATIENT
Start: 2019-03-14 | End: 2019-03-15 | Stop reason: HOSPADM

## 2019-03-14 RX ORDER — OXYCODONE HYDROCHLORIDE 5 MG/1
5 TABLET ORAL EVERY 6 HOURS PRN
Qty: 6 TABLET | Refills: 0 | Status: SHIPPED | OUTPATIENT
Start: 2019-03-14 | End: 2019-04-04

## 2019-03-14 RX ORDER — CEFAZOLIN SODIUM 2 G/50ML
2 SOLUTION INTRAVENOUS
Status: COMPLETED | OUTPATIENT
Start: 2019-03-14 | End: 2019-03-14

## 2019-03-14 RX ORDER — PROPOFOL 10 MG/ML
INJECTION, EMULSION INTRAVENOUS CONTINUOUS PRN
Status: DISCONTINUED | OUTPATIENT
Start: 2019-03-14 | End: 2019-03-14

## 2019-03-14 RX ORDER — ACETAMINOPHEN 325 MG/1
975 TABLET ORAL ONCE
Status: DISCONTINUED | OUTPATIENT
Start: 2019-03-14 | End: 2019-03-15 | Stop reason: HOSPADM

## 2019-03-14 RX ORDER — ONDANSETRON 2 MG/ML
INJECTION INTRAMUSCULAR; INTRAVENOUS PRN
Status: DISCONTINUED | OUTPATIENT
Start: 2019-03-14 | End: 2019-03-14

## 2019-03-14 RX ORDER — NALOXONE HYDROCHLORIDE 0.4 MG/ML
.1-.4 INJECTION, SOLUTION INTRAMUSCULAR; INTRAVENOUS; SUBCUTANEOUS
Status: DISCONTINUED | OUTPATIENT
Start: 2019-03-14 | End: 2019-03-15 | Stop reason: HOSPADM

## 2019-03-14 RX ORDER — DEXAMETHASONE SODIUM PHOSPHATE 4 MG/ML
INJECTION, SOLUTION INTRA-ARTICULAR; INTRALESIONAL; INTRAMUSCULAR; INTRAVENOUS; SOFT TISSUE PRN
Status: DISCONTINUED | OUTPATIENT
Start: 2019-03-14 | End: 2019-03-14

## 2019-03-14 RX ORDER — FENTANYL CITRATE 50 UG/ML
25-50 INJECTION, SOLUTION INTRAMUSCULAR; INTRAVENOUS
Status: DISCONTINUED | OUTPATIENT
Start: 2019-03-14 | End: 2019-03-15 | Stop reason: HOSPADM

## 2019-03-14 RX ORDER — GABAPENTIN 300 MG/1
300 CAPSULE ORAL ONCE
Status: DISCONTINUED | OUTPATIENT
Start: 2019-03-14 | End: 2019-03-15 | Stop reason: HOSPADM

## 2019-03-14 RX ORDER — OXYCODONE HYDROCHLORIDE 5 MG/1
5 TABLET ORAL EVERY 4 HOURS PRN
Status: DISCONTINUED | OUTPATIENT
Start: 2019-03-14 | End: 2019-03-15 | Stop reason: HOSPADM

## 2019-03-14 RX ORDER — LIDOCAINE HYDROCHLORIDE 20 MG/ML
INJECTION, SOLUTION INFILTRATION; PERINEURAL PRN
Status: DISCONTINUED | OUTPATIENT
Start: 2019-03-14 | End: 2019-03-14

## 2019-03-14 RX ORDER — CEFAZOLIN SODIUM 1 G/50ML
1 SOLUTION INTRAVENOUS SEE ADMIN INSTRUCTIONS
Status: DISCONTINUED | OUTPATIENT
Start: 2019-03-14 | End: 2019-03-15 | Stop reason: HOSPADM

## 2019-03-14 RX ADMIN — FENTANYL CITRATE 25 MCG: 50 INJECTION, SOLUTION INTRAMUSCULAR; INTRAVENOUS at 13:37

## 2019-03-14 RX ADMIN — PROPOFOL 150 MCG/KG/MIN: 10 INJECTION, EMULSION INTRAVENOUS at 13:33

## 2019-03-14 RX ADMIN — FENTANYL CITRATE 25 MCG: 50 INJECTION, SOLUTION INTRAMUSCULAR; INTRAVENOUS at 13:36

## 2019-03-14 RX ADMIN — DEXAMETHASONE SODIUM PHOSPHATE 4 MG: 4 INJECTION, SOLUTION INTRA-ARTICULAR; INTRALESIONAL; INTRAMUSCULAR; INTRAVENOUS; SOFT TISSUE at 13:31

## 2019-03-14 RX ADMIN — CEFAZOLIN SODIUM 2 G: 2 SOLUTION INTRAVENOUS at 13:33

## 2019-03-14 RX ADMIN — FENTANYL CITRATE 50 MCG: 50 INJECTION, SOLUTION INTRAMUSCULAR; INTRAVENOUS at 13:41

## 2019-03-14 RX ADMIN — LIDOCAINE HYDROCHLORIDE 100 MG: 20 INJECTION, SOLUTION INFILTRATION; PERINEURAL at 13:31

## 2019-03-14 RX ADMIN — ONDANSETRON 4 MG: 2 INJECTION INTRAMUSCULAR; INTRAVENOUS at 13:31

## 2019-03-14 RX ADMIN — SODIUM CHLORIDE, SODIUM LACTATE, POTASSIUM CHLORIDE, CALCIUM CHLORIDE: 600; 310; 30; 20 INJECTION, SOLUTION INTRAVENOUS at 13:26

## 2019-03-14 ASSESSMENT — MIFFLIN-ST. JEOR: SCORE: 1799.48

## 2019-03-14 NOTE — PROVIDER NOTIFICATION
Md paged regarding narcotic order. Pharm states pt insurance denies all narcotic orders at this time. Awaiting MD response to possible different approved meds.

## 2019-03-14 NOTE — ANESTHESIA POSTPROCEDURE EVALUATION
Anesthesia POST Procedure Evaluation    Patient: Kirit Logan   MRN:     [id number] Gender:   male   Age:    33 year old :      1985        Preoperative Diagnosis: Healed Osteotomy, Right   Procedure(s):  Removal Right Tibial Fixator   Postop Comments: No value filed.       Anesthesia Type:  MAC    Reportable Event: NO     PAIN: Uncomplicated   Sign Out status: Comfortable, Well controlled pain     PONV: No PONV   Sign Out status:  No Nausea or Vomiting     Neuro/Psych: Uneventful perioperative course   Sign Out Status: Preoperative baseline; Age appropriate mentation     Airway/Resp.: Uneventful perioperative course   Sign Out Status: Non labored breathing, age appropriate RR; Resp. Status within EXPECTED Parameters     CV: Uneventful perioperative course   Sign Out status: Appropriate BP and perfusion indices; Appropriate HR/Rhythm     Disposition:   Sign Out in:  PACU  Disposition:  Phase II; Home  Recovery Course: Uneventful  Follow-Up: Not required           Last Anesthesia Record Vitals:  CRNA VITALS  3/14/2019 1323 - 3/14/2019 1423      3/14/2019             Pulse:  72    SpO2:  99 %    Resp Rate (set):  10          Last PACU/Preop Vitals:  Vitals:    19 1239 19 1356   BP: (!) 128/93 128/76   Resp: 16 14   Temp: 36.8  C (98.2  F) 36.6  C (97.9  F)   SpO2: 98% 98%         Electronically Signed By: Stuart Iqbal MD, 2019, 3:33 PM

## 2019-03-14 NOTE — ANESTHESIA CARE TRANSFER NOTE
Patient: Kirit Logan    Procedure(s):  Removal Right Tibial Fixator    Diagnosis: Healed Osteotomy, Right  Diagnosis Additional Information: No value filed.    Anesthesia Type:   No value filed.     Note:  Airway :Room Air  Patient transferred to:Phase II  Comments: Uneventful transport to Phase II: VSS; IV patent; pt comfortable; Report given to RN; pt. Responds appropriately to commandsHandoff Report: Identifed the Patient, Identified the Reponsible Provider, Reviewed the pertinent medical history, Discussed the surgical course, Reviewed Intra-OP anesthesia mangement and issues during anesthesia, Set expectations for post-procedure period and Allowed opportunity for questions and acknowledgement of understanding      Vitals: (Last set prior to Anesthesia Care Transfer)    CRNA VITALS  3/14/2019 1323 - 3/14/2019 1359      3/14/2019             Pulse:  72    SpO2:  99 %    Resp Rate (set):  10                Electronically Signed By: ARAM Carter CRNA  March 14, 2019  1:59 PM

## 2019-03-14 NOTE — DISCHARGE INSTRUCTIONS
[facility]  Home Care Following Anesthesia  For 24 hours after surgery:  1. Get plenty of rest.  A responsible adult must stay with you for at least 24 hours after you leave the surgery center.  2. Do not drive or use heavy equipment.  If you have weakness or tingling, don't drive or use heavy equipment until this feeling goes away.   3. Do not drink alcohol.   4. Avoid strenuous or risky activities.  Ask for help when climbing stairs.  5. You may feel lightheaded.  IF so, sit for a few minutes before standing.  Have someone help you get up.   6. If you have nausea (feel sick to your stomach): Drink only clear liquids such as apple juice, ginger ale, broth or 7-Up.  Rest may also help.  Be sure to drink enough fluids.  Move to a regular diet as you feel able.   7. You may have a slight fever.  Call the doctor if your fever is over 100 F (37.7 C) (taken under the tongue) or lasts longer than 24 hours.  8. You may have a dry mouth, a sore throat, muscle aches or trouble sleeping. These should go away after 24 hours.  9. Do not make important or legal decisions.               Tips for taking pain medications  To get the best pain relief possible, remember these points:    Take pain medications as directed, before pain becomes severe.    Pain medication can upset your stomach: taking it with food may help.    Constipation is a common side effect of pain medication. Drink plenty of  fluids.    Eat foods high in fiber. Take a stool softener if recommended by your doctor or pharmacist.    Do not drink alcohol, drive or operate machinery while taking pain medications.    Ask about other ways to control pain, such as with heat, ice or relaxation.    Tylenol/Acetaminophen Consumption  To help encourage the safe use of acetaminophen, the makers of TYLENOL  have lowered the maximum daily dose for single-ingredient Extra Strength TYLENOL  (acetaminophen) products sold in the [place] from 8  pills per day (4,000 mg) to 6 pills per day (3,000 mg). The dosing interval has also changed from 2 pills every 4-6 hours to 2 pills every 6 hours.    If you feel your pain relief is insufficient, you may take Tylenol/Acetaminophen in addition to your narcotic pain medication.     Be careful not to exceed 3,000 mg of Tylenol/Acetaminophen in a 24 hour period from all sources.    If you are taking extra strength Tylenol/acetaminophen (500 mg), the maximum dose is 6 tablets in 24 hours.    If you are taking regular strength acetaminophen (325 mg), the maximum dose is 9 tablets in 24 hours.    Call a doctor for any of the followin. Signs of infection (fever, growing tenderness at the surgery site, a large amount of drainage or bleeding, severe pain, foul-smelling drainage, redness, swelling).  2. It has been over 8 to 10 hours since surgery and you are still not able to urinate (pass water).  3. Headache for over 24 hours.  4. Numbness, tingling or weakness the day after surgery (if you had spinal anesthesia).  Your doctor is:  Dr. Shaheed Stallworth, Orthopaedics: [phone]                    Or dial [phone] and ask for the resident on call for:  Orthopaedics  For emergency care, call the:  [facility] [phone] (TTY for hearing impaired: [phone])

## 2019-03-15 ENCOUNTER — TELEPHONE (OUTPATIENT)
Dept: ORTHOPEDICS | Facility: CLINIC | Age: 34
End: 2019-03-15

## 2019-03-15 NOTE — TELEPHONE ENCOUNTER
[facility] Call Center    Phone Message    May a detailed message be left on voicemail: yes    Reason for Call: Other: Pt has some questions about what he should do going forward after surgery.     Action Taken: Message routed to:  [facility] ([facility]): Ortho

## 2019-03-18 NOTE — OP NOTE
Operation: Removal of Harleen Spatial Frame    Indication: Status Post High Tibial Osteotomy Revision + Gradual Correction with Frame    Surgeon: Dr Shaheed Stallworth MD    Assistant: Dr Brian Huang MD, Tye Drake MD    Description:   Kirit is now 10 weeks post application of spatial frame to his right proximal tibia, correction of a deformity produced following a high tibial osteotomy.  His mechanical axis is corrected nicely to the appropriately planned point, there is evidence of healing at the osteotomy site, and she experienced little or no pain after loosening of the frame last week.  He understood the risks and benefits of the procedure and elected to proceed.    Kirit was taken to the operating room positioned supine and given sedation.  The pin sites were prepped with Betadine.  The beaded sides of the wires were noted, cut appropriately, and then removed.  The HA-coated pins were then removed using a T-handle rehana.  Open sites were satisfactory with no evidence of purulence.  The frame was then carefully removed from the limb.    After gentle cleaning of the leg, wounds were dressed with bacitracin, then dressed with Adaptic 4 x 4 cast padding and Ace wrap.  A knee immobilizer was applied to the limb.  The patient returned to PACU in good condition      Post Op: Kirit may bear weight while using crutches and a knee immobilizer.  Will review him in clinic on Monday, and consider upgrading his weightbearing status at that point.  He      - Dr. Brian Hunag (Orthopaedic Fellow)

## 2019-03-19 ENCOUNTER — TELEPHONE (OUTPATIENT)
Dept: ORTHOPEDICS | Facility: CLINIC | Age: 34
End: 2019-03-19

## 2019-03-19 NOTE — TELEPHONE ENCOUNTER
[facility] Call Center    Phone Message    May a detailed message be left on voicemail: yes    Reason for Call: Other: DOS 3/14/19, pt is wondering if he can take the ace wrap off to ice and also take off at night to sleep. Please call pt back.     Action Taken: Message routed to:  [facility] ([facility] ortho

## 2019-03-21 DIAGNOSIS — M21.969 TIBIAL DEFORMITY, ACQUIRED: Primary | ICD-10-CM

## 2019-04-04 ENCOUNTER — ANCILLARY PROCEDURE (OUTPATIENT)
Dept: GENERAL RADIOLOGY | Facility: CLINIC | Age: 34
End: 2019-04-04
Attending: NURSE PRACTITIONER
Payer: COMMERCIAL

## 2019-04-04 ENCOUNTER — OFFICE VISIT (OUTPATIENT)
Dept: ORTHOPEDICS | Facility: CLINIC | Age: 34
End: 2019-04-04
Payer: COMMERCIAL

## 2019-04-04 DIAGNOSIS — M21.969 TIBIAL DEFORMITY, ACQUIRED: ICD-10-CM

## 2019-04-04 DIAGNOSIS — Z98.890 STATUS POST OSTEOTOMY: Primary | ICD-10-CM

## 2019-04-04 ASSESSMENT — ENCOUNTER SYMPTOMS
MYALGIAS: 0
BACK PAIN: 0
NECK PAIN: 0
JOINT SWELLING: 1
MUSCLE CRAMPS: 0
STIFFNESS: 0
MUSCLE WEAKNESS: 0
ARTHRALGIAS: 1

## 2019-04-04 NOTE — LETTER
"4/4/2019       RE: Kirit Logan  [address]  [place] [address]     Dear Colleague,    Thank you for referring your patient, Kirit Logan, to the [facility]. Please see a copy of my visit note below.    Kirit is seen 2 weeks post op of his removal external fixator for revision osteotomy on 3/14/2019. He states he is \"feeling great\". He has no pain at the osteotomy site and is walking without crutches or limp. Incisions are healed/intact. Mild knee effusion. Alignment appears to be in neutral on exam. Quad strength is 5/5 and symmetrical to contralateral side. Denies fevers or chills. No calf pain upon palpation.    Xrays taken show excellent alignment of neutral with a well healing osteotomy.    Dx:  1. Removal external fixator right tibia s/p revision osteotomy    Plan:  1. Continue to advance activities as tolerated. Was advised if he experiences pain at the osteotomy sie to back off on activites and use crutches.   2. Follow up in 1 month for a full length and a right knee lateral on arrival.     Sheree Tapia, ARAM CNP      "

## 2019-04-04 NOTE — LETTER
Verifying [facility]  2019     Seen today: yes    Patient:  Kirit Logan  :   1985  MRN:     [id number]  Physician: SWETHA ROONEY    Kirit Logan is a patient of myself and Dr. Stallworth who under went an extensive osteotomy revision procedure requiring external fixation that required 24 hour care from his wife including the dates of 3/22/2019 and 3/25/2019. He had another surgery for removal of the fixator on 3/14/2019. He was incapacitated secondary to these surgical procedures.      The next clinic appointment is scheduled for May     Patient limitations:  He is now without external fixation and is advancing to normal activity as tolerated based on healing of his osteotomy.              Electronically signed by ARAM Parker CNP

## 2019-04-04 NOTE — PROGRESS NOTES
"Kirit is seen 2 weeks post op of his removal external fixator for revision osteotomy on 3/14/2019. He states he is \"feeling great\". He has no pain at the osteotomy site and is walking without crutches or limp. Incisions are healed/intact. Mild knee effusion. Alignment appears to be in neutral on exam. Quad strength is 5/5 and symmetrical to contralateral side. Denies fevers or chills. No calf pain upon palpation.    Xrays taken show excellent alignment of neutral with a well healing osteotomy.    Dx:  1. Removal external fixator right tibia s/p revision osteotomy    Plan:  1. Continue to advance activities as tolerated. Was advised if he experiences pain at the osteotomy sie to back off on activites and use crutches.   2. Follow up in 1 month for a full length and a right knee lateral on arrival.                                                                                                                                                                                                                                             "

## 2019-05-10 DIAGNOSIS — M79.604 RIGHT LEG PAIN: Primary | ICD-10-CM

## 2019-05-13 ENCOUNTER — OFFICE VISIT (OUTPATIENT)
Dept: ORTHOPEDICS | Facility: CLINIC | Age: 34
End: 2019-05-13
Payer: COMMERCIAL

## 2019-05-13 ENCOUNTER — ANCILLARY PROCEDURE (OUTPATIENT)
Dept: GENERAL RADIOLOGY | Facility: CLINIC | Age: 34
End: 2019-05-13
Attending: PODIATRIST
Payer: COMMERCIAL

## 2019-05-13 ENCOUNTER — ANCILLARY PROCEDURE (OUTPATIENT)
Dept: GENERAL RADIOLOGY | Facility: CLINIC | Age: 34
End: 2019-05-13
Attending: NURSE PRACTITIONER
Payer: COMMERCIAL

## 2019-05-13 DIAGNOSIS — M21.70 LEG LENGTH DIFFERENCE, ACQUIRED: Primary | ICD-10-CM

## 2019-05-13 DIAGNOSIS — M79.604 RIGHT LEG PAIN: ICD-10-CM

## 2019-05-13 DIAGNOSIS — M96.89 MALUNION OF OSTEOTOMY SITE: ICD-10-CM

## 2019-05-13 DIAGNOSIS — M21.70 LEG LENGTH DIFFERENCE, ACQUIRED: ICD-10-CM

## 2019-05-13 NOTE — PROGRESS NOTES
Kirit is seen 8 weeks postop office fixator removal status post revision osteotomy of his right proximal tibia.  He reports is doing well has no complications he is back to work with no concerns his wounds are healed benign and intact his alignment appears to be neutral on exam today.  His knee exam is within normal limits with no effusion.  Incisions are all healed benign and intact.  He has no pain with hip internal and external rotation in his foot and ankle exam are also within normal limits.    X-rays and a full length were taken which is neutral alignment that is markedly improved compared to his valgus alignment preoperatively.  Osteotomy is well-healed.  No acute changes noted    Diagnosis status post revision osteotomy right proximal tibia    Plan    1.  He would advance activities as he is feels comfortable letting pain be his guide.  Posttraumatic knee arthritis symptoms were discussed briefly with the patient in detail he will follow-up with us in 6 months for an AP lateral of his right knee on arrival.    I, Dr. Shaheed Stallworth, agree with above plan of care and examined the patient.

## 2019-05-13 NOTE — NURSING NOTE
Reason For Visit:   Chief Complaint   Patient presents with     RECHECK     6 week POP tibial fixator removal DOS: 3/14/19       There were no vitals taken for this visit.    Pain Assessment  Patient Currently in Pain: David Siddiqui, ATC

## 2019-05-13 NOTE — LETTER
5/13/2019       RE: Kirit Logan  [address]  [place] [facility] [address]     Dear Colleague,    Thank you for referring your patient, Kirit Logan, to the [facility]. Please see a copy of my visit note below.    Kirit is seen 8 weeks postop office fixator removal status post revision osteotomy of his right proximal tibia.  He reports is doing well has no complications he is back to work with no concerns his wounds are healed benign and intact his alignment appears to be neutral on exam today.  His knee exam is within normal limits with no effusion.  Incisions are all healed benign and intact.  He has no pain with hip internal and external rotation in his foot and ankle exam are also within normal limits.    X-rays and a full length were taken which is neutral alignment that is markedly improved compared to his valgus alignment preoperatively.  Osteotomy is well-healed.  No acute changes noted    Diagnosis status post revision osteotomy right proximal tibia    Plan    1.  He would advance activities as he is feels comfortable letting pain be his guide.  Posttraumatic knee arthritis symptoms were discussed briefly with the patient in detail he will follow-up with us in 6 months for an AP lateral of his right knee on arrival.    I, Dr. Shaheed Stallworth, agree with above plan of care and examined the patient.

## 2019-11-07 ENCOUNTER — HEALTH MAINTENANCE LETTER (OUTPATIENT)
Age: 34
End: 2019-11-07

## 2020-11-29 ENCOUNTER — HEALTH MAINTENANCE LETTER (OUTPATIENT)
Age: 35
End: 2020-11-29

## 2021-09-25 ENCOUNTER — HEALTH MAINTENANCE LETTER (OUTPATIENT)
Age: 36
End: 2021-09-25

## 2022-01-15 ENCOUNTER — HEALTH MAINTENANCE LETTER (OUTPATIENT)
Age: 37
End: 2022-01-15

## 2022-12-26 ENCOUNTER — HEALTH MAINTENANCE LETTER (OUTPATIENT)
Age: 37
End: 2022-12-26

## 2023-04-16 ENCOUNTER — HEALTH MAINTENANCE LETTER (OUTPATIENT)
Age: 38
End: 2023-04-16

## (undated) DEVICE — SYR 10ML FINGER CONTROL W/O NDL 309695

## (undated) DEVICE — BLADE SAW SAGITTAL STRK XSHORT 25X9.5X0.6MM 2108-145-000

## (undated) DEVICE — GLOVE PROTEXIS W/NEU-THERA 8.0  2D73TE80

## (undated) DEVICE — Device

## (undated) DEVICE — SUCTION MANIFOLD DORNOCH ULTRA CART UL-CL500

## (undated) DEVICE — SU VICRYL 2-0 SH 27" UND J417H

## (undated) DEVICE — DRSG KERLIX 4 1/2"X4YDS ROLL 6730

## (undated) DEVICE — CAST PADDING 6" STERILE 9046S

## (undated) DEVICE — ESU GROUND PAD ADULT W/CORD E7507

## (undated) DEVICE — DRSG KERLIX FLUFFS X5

## (undated) DEVICE — GLOVE PROTEXIS BLUE W/NEU-THERA 8.0  2D73EB80

## (undated) DEVICE — GLOVE PROTEXIS BLUE W/NEU-THERA 7.5  2D73EB75

## (undated) DEVICE — SOL NACL 0.9% IRRIG 1000ML BOTTLE 2F7124

## (undated) DEVICE — STRAP KNEE/BODY 31143004

## (undated) DEVICE — DRSG ADAPTIC 3X8" 6113

## (undated) DEVICE — LIGHT HANDLE X1 31140133

## (undated) DEVICE — NDL 22GA 1.5"

## (undated) DEVICE — GOWN XLG DISP 9545

## (undated) DEVICE — ESU GROUND PAD UNIVERSAL W/O CORD

## (undated) DEVICE — LINEN DRAPE 54X72" 5467

## (undated) DEVICE — SYR 30ML LL W/O NDL 302832

## (undated) DEVICE — DRAPE C-ARM W/STRAPS 42X72" 07-CA104

## (undated) DEVICE — SOL ISOPROPYL RUBBING ALCOHOL USP 70% 4OZ HDX-20 I0020

## (undated) DEVICE — BASIN SET MAJOR

## (undated) DEVICE — PREP DURAPREP 26ML APL 8630

## (undated) DEVICE — DRAPE CONVERTORS U-DRAPE 60X72" 8476

## (undated) DEVICE — TOURNIQUET CUFF 30" REPRO BLUE 60-7070-105

## (undated) DEVICE — SU MONOCRYL 3-0 PS-2 18" UND Y497G

## (undated) DEVICE — LINEN TOWEL PACK X5 5464

## (undated) DEVICE — DISCLIP 5MM RED

## (undated) DEVICE — NDL SPINAL 18GA 3.5" 405184

## (undated) DEVICE — SOL WATER IRRIG 1000ML BOTTLE 2F7114

## (undated) DEVICE — BLADE KNIFE SURG 15 371115

## (undated) DEVICE — SU VICRYL 2-0 CT-1 27" UND J259H

## (undated) DEVICE — LINEN GOWN X4 5410

## (undated) DEVICE — PACK LOWER EXTREMITY CUSTOM ASC

## (undated) DEVICE — PACK LOWER EXTREMITY RIVERSIDE SOP32LEFSX

## (undated) DEVICE — BNDG ESMARK 6" STERILE LF 820-3612

## (undated) DEVICE — SPONGE LAP 18X18" X8435

## (undated) DEVICE — BLADE KNIFE SURG 10 371110

## (undated) DEVICE — DRSG GAUZE 4X4" TRAY 6939

## (undated) DEVICE — BLADE KNIFE SURG 11 371111

## (undated) DEVICE — LINEN ORTHO PACK 5446

## (undated) DEVICE — SU VICRYL 2-0 CT-2 27" UND J269H

## (undated) DEVICE — DRAPE C-ARMOR 5 SIDED 5523

## (undated) RX ORDER — PROPOFOL 10 MG/ML
INJECTION, EMULSION INTRAVENOUS
Status: DISPENSED
Start: 2018-12-21

## (undated) RX ORDER — CEFAZOLIN SODIUM 1 G/50ML
SOLUTION INTRAVENOUS
Status: DISPENSED
Start: 2019-03-14

## (undated) RX ORDER — FENTANYL CITRATE 50 UG/ML
INJECTION, SOLUTION INTRAMUSCULAR; INTRAVENOUS
Status: DISPENSED
Start: 2019-03-14

## (undated) RX ORDER — ONDANSETRON 2 MG/ML
INJECTION INTRAMUSCULAR; INTRAVENOUS
Status: DISPENSED
Start: 2018-12-21

## (undated) RX ORDER — PROPOFOL 10 MG/ML
INJECTION, EMULSION INTRAVENOUS
Status: DISPENSED
Start: 2019-03-14

## (undated) RX ORDER — FENTANYL CITRATE 50 UG/ML
INJECTION, SOLUTION INTRAMUSCULAR; INTRAVENOUS
Status: DISPENSED
Start: 2018-12-21

## (undated) RX ORDER — LIDOCAINE HYDROCHLORIDE 20 MG/ML
INJECTION, SOLUTION EPIDURAL; INFILTRATION; INTRACAUDAL; PERINEURAL
Status: DISPENSED
Start: 2018-12-21

## (undated) RX ORDER — CEFAZOLIN SODIUM 2 G/100ML
INJECTION, SOLUTION INTRAVENOUS
Status: DISPENSED
Start: 2018-12-21

## (undated) RX ORDER — DEXAMETHASONE SODIUM PHOSPHATE 4 MG/ML
INJECTION, SOLUTION INTRA-ARTICULAR; INTRALESIONAL; INTRAMUSCULAR; INTRAVENOUS; SOFT TISSUE
Status: DISPENSED
Start: 2018-12-21

## (undated) RX ORDER — HYDROMORPHONE HYDROCHLORIDE 1 MG/ML
INJECTION, SOLUTION INTRAMUSCULAR; INTRAVENOUS; SUBCUTANEOUS
Status: DISPENSED
Start: 2018-12-21

## (undated) RX ORDER — BUPIVACAINE HYDROCHLORIDE AND EPINEPHRINE 5; 5 MG/ML; UG/ML
INJECTION, SOLUTION EPIDURAL; INTRACAUDAL; PERINEURAL
Status: DISPENSED
Start: 2019-03-14

## (undated) RX ORDER — GABAPENTIN 300 MG/1
CAPSULE ORAL
Status: DISPENSED
Start: 2019-03-14

## (undated) RX ORDER — PHENYLEPHRINE HCL IN 0.9% NACL 1 MG/10 ML
SYRINGE (ML) INTRAVENOUS
Status: DISPENSED
Start: 2018-12-21

## (undated) RX ORDER — ACETAMINOPHEN 325 MG/1
TABLET ORAL
Status: DISPENSED
Start: 2019-03-14

## (undated) RX ORDER — BUPIVACAINE HYDROCHLORIDE AND EPINEPHRINE 5; 5 MG/ML; UG/ML
INJECTION, SOLUTION PERINEURAL
Status: DISPENSED
Start: 2018-12-21